# Patient Record
Sex: MALE | Race: WHITE | NOT HISPANIC OR LATINO | Employment: OTHER | URBAN - METROPOLITAN AREA
[De-identification: names, ages, dates, MRNs, and addresses within clinical notes are randomized per-mention and may not be internally consistent; named-entity substitution may affect disease eponyms.]

---

## 2017-05-01 ENCOUNTER — HOSPITAL ENCOUNTER (OUTPATIENT)
Dept: GENERAL RADIOLOGY | Facility: HOSPITAL | Age: 67
Discharge: HOME | End: 2017-05-01
Admitting: ORTHOPAEDIC SURGERY

## 2017-05-01 ENCOUNTER — APPOINTMENT (OUTPATIENT)
Dept: PREADMISSION TESTING | Facility: HOSPITAL | Age: 67
End: 2017-05-01

## 2017-05-01 VITALS
OXYGEN SATURATION: 96 % | DIASTOLIC BLOOD PRESSURE: 88 MMHG | SYSTOLIC BLOOD PRESSURE: 146 MMHG | HEART RATE: 99 BPM | WEIGHT: 211.2 LBS | RESPIRATION RATE: 16 BRPM | BODY MASS INDEX: 28.61 KG/M2 | TEMPERATURE: 98.5 F | HEIGHT: 72 IN

## 2017-05-01 LAB
ALBUMIN SERPL-MCNC: 4 G/DL (ref 3.5–5.2)
ALBUMIN/GLOB SERPL: 1.4 G/DL
ALP SERPL-CCNC: 87 U/L (ref 39–117)
ALT SERPL W P-5'-P-CCNC: 26 U/L (ref 1–41)
ANION GAP SERPL CALCULATED.3IONS-SCNC: 14.8 MMOL/L
AST SERPL-CCNC: 21 U/L (ref 1–40)
BASOPHILS # BLD AUTO: 0.06 10*3/MM3 (ref 0–0.2)
BASOPHILS NFR BLD AUTO: 1.3 % (ref 0–1.5)
BILIRUB SERPL-MCNC: 0.7 MG/DL (ref 0.1–1.2)
BILIRUB UR QL STRIP: NEGATIVE
BUN BLD-MCNC: 22 MG/DL (ref 8–23)
BUN/CREAT SERPL: 17.9 (ref 7–25)
CALCIUM SPEC-SCNC: 10.5 MG/DL (ref 8.6–10.5)
CHLORIDE SERPL-SCNC: 98 MMOL/L (ref 98–107)
CLARITY UR: CLEAR
CO2 SERPL-SCNC: 21.2 MMOL/L (ref 22–29)
COLOR UR: ABNORMAL
CREAT BLD-MCNC: 1.23 MG/DL (ref 0.76–1.27)
DEPRECATED RDW RBC AUTO: 49 FL (ref 37–54)
EOSINOPHIL # BLD AUTO: 0.22 10*3/MM3 (ref 0–0.7)
EOSINOPHIL NFR BLD AUTO: 4.6 % (ref 0.3–6.2)
ERYTHROCYTE [DISTWIDTH] IN BLOOD BY AUTOMATED COUNT: 13.8 % (ref 11.5–14.5)
GFR SERPL CREATININE-BSD FRML MDRD: 59 ML/MIN/1.73
GLOBULIN UR ELPH-MCNC: 2.9 GM/DL
GLUCOSE BLD-MCNC: 330 MG/DL (ref 65–99)
GLUCOSE UR STRIP-MCNC: ABNORMAL MG/DL
HCT VFR BLD AUTO: 43.5 % (ref 40.4–52.2)
HGB BLD-MCNC: 14.8 G/DL (ref 13.7–17.6)
HGB UR QL STRIP.AUTO: NEGATIVE
IMM GRANULOCYTES # BLD: 0 10*3/MM3 (ref 0–0.03)
IMM GRANULOCYTES NFR BLD: 0 % (ref 0–0.5)
KETONES UR QL STRIP: ABNORMAL
LEUKOCYTE ESTERASE UR QL STRIP.AUTO: NEGATIVE
LYMPHOCYTES # BLD AUTO: 0.89 10*3/MM3 (ref 0.9–4.8)
LYMPHOCYTES NFR BLD AUTO: 18.5 % (ref 19.6–45.3)
MCH RBC QN AUTO: 33 PG (ref 27–32.7)
MCHC RBC AUTO-ENTMCNC: 34 G/DL (ref 32.6–36.4)
MCV RBC AUTO: 97.1 FL (ref 79.8–96.2)
MONOCYTES # BLD AUTO: 0.55 10*3/MM3 (ref 0.2–1.2)
MONOCYTES NFR BLD AUTO: 11.5 % (ref 5–12)
NEUTROPHILS # BLD AUTO: 3.08 10*3/MM3 (ref 1.9–8.1)
NEUTROPHILS NFR BLD AUTO: 64.1 % (ref 42.7–76)
NITRITE UR QL STRIP: NEGATIVE
PH UR STRIP.AUTO: <=5 [PH] (ref 5–8)
PLATELET # BLD AUTO: 160 10*3/MM3 (ref 140–500)
PMV BLD AUTO: 10.2 FL (ref 6–12)
POTASSIUM BLD-SCNC: 4.4 MMOL/L (ref 3.5–5.2)
PROT SERPL-MCNC: 6.9 G/DL (ref 6–8.5)
PROT UR QL STRIP: NEGATIVE
RBC # BLD AUTO: 4.48 10*6/MM3 (ref 4.6–6)
SODIUM BLD-SCNC: 134 MMOL/L (ref 136–145)
SP GR UR STRIP: >=1.03 (ref 1–1.03)
UROBILINOGEN UR QL STRIP: ABNORMAL
WBC NRBC COR # BLD: 4.8 10*3/MM3 (ref 4.5–10.7)

## 2017-05-01 PROCEDURE — 85025 COMPLETE CBC W/AUTO DIFF WBC: CPT | Performed by: ORTHOPAEDIC SURGERY

## 2017-05-01 PROCEDURE — 36415 COLL VENOUS BLD VENIPUNCTURE: CPT

## 2017-05-01 PROCEDURE — 73560 X-RAY EXAM OF KNEE 1 OR 2: CPT

## 2017-05-01 PROCEDURE — 80053 COMPREHEN METABOLIC PANEL: CPT | Performed by: ORTHOPAEDIC SURGERY

## 2017-05-01 PROCEDURE — 81003 URINALYSIS AUTO W/O SCOPE: CPT | Performed by: ORTHOPAEDIC SURGERY

## 2017-05-01 RX ORDER — CHLORHEXIDINE GLUCONATE 500 MG/1
1 CLOTH TOPICAL TAKE AS DIRECTED
COMMUNITY
End: 2017-06-24 | Stop reason: HOSPADM

## 2017-05-01 RX ORDER — ZOLPIDEM TARTRATE 10 MG/1
10 TABLET ORAL NIGHTLY PRN
COMMUNITY

## 2017-05-16 ENCOUNTER — HOSPITAL ENCOUNTER (OUTPATIENT)
Facility: HOSPITAL | Age: 67
Setting detail: SURGERY ADMIT
Discharge: HOME | End: 2017-05-16
Attending: ORTHOPAEDIC SURGERY | Admitting: ORTHOPAEDIC SURGERY

## 2017-05-16 VITALS
WEIGHT: 207.1 LBS | DIASTOLIC BLOOD PRESSURE: 92 MMHG | HEART RATE: 98 BPM | TEMPERATURE: 98 F | BODY MASS INDEX: 28.05 KG/M2 | OXYGEN SATURATION: 95 % | SYSTOLIC BLOOD PRESSURE: 142 MMHG | RESPIRATION RATE: 20 BRPM | HEIGHT: 72 IN

## 2017-05-16 LAB — GLUCOSE BLDC GLUCOMTR-MCNC: 251 MG/DL (ref 70–130)

## 2017-05-16 PROCEDURE — 82962 GLUCOSE BLOOD TEST: CPT

## 2017-05-16 RX ORDER — CEFAZOLIN SODIUM 2 G/100ML
2 INJECTION, SOLUTION INTRAVENOUS ONCE
Status: DISCONTINUED | OUTPATIENT
Start: 2017-05-16 | End: 2017-05-16 | Stop reason: HOSPADM

## 2017-06-20 ENCOUNTER — APPOINTMENT (OUTPATIENT)
Dept: PREADMISSION TESTING | Facility: HOSPITAL | Age: 67
End: 2017-06-20

## 2017-06-20 VITALS
TEMPERATURE: 98.6 F | SYSTOLIC BLOOD PRESSURE: 162 MMHG | HEART RATE: 94 BPM | BODY MASS INDEX: 28.18 KG/M2 | DIASTOLIC BLOOD PRESSURE: 82 MMHG | HEIGHT: 72 IN | WEIGHT: 208.06 LBS | RESPIRATION RATE: 16 BRPM | OXYGEN SATURATION: 96 %

## 2017-06-20 LAB
ABO GROUP BLD: NORMAL
ALBUMIN SERPL-MCNC: 4.2 G/DL (ref 3.5–5.2)
ALBUMIN/GLOB SERPL: 1.4 G/DL
ALP SERPL-CCNC: 73 U/L (ref 39–117)
ALT SERPL W P-5'-P-CCNC: 38 U/L (ref 1–41)
ANION GAP SERPL CALCULATED.3IONS-SCNC: 14.1 MMOL/L
AST SERPL-CCNC: 25 U/L (ref 1–40)
BASOPHILS # BLD AUTO: 0.04 10*3/MM3 (ref 0–0.2)
BASOPHILS NFR BLD AUTO: 0.8 % (ref 0–1.5)
BILIRUB SERPL-MCNC: 0.5 MG/DL (ref 0.1–1.2)
BILIRUB UR QL STRIP: NEGATIVE
BLD GP AB SCN SERPL QL: NEGATIVE
BUN BLD-MCNC: 15 MG/DL (ref 8–23)
BUN/CREAT SERPL: 13.5 (ref 7–25)
CALCIUM SPEC-SCNC: 10 MG/DL (ref 8.6–10.5)
CHLORIDE SERPL-SCNC: 94 MMOL/L (ref 98–107)
CLARITY UR: CLEAR
CO2 SERPL-SCNC: 25.9 MMOL/L (ref 22–29)
COLOR UR: ABNORMAL
CREAT BLD-MCNC: 1.11 MG/DL (ref 0.76–1.27)
DEPRECATED RDW RBC AUTO: 45.2 FL (ref 37–54)
EOSINOPHIL # BLD AUTO: 0.22 10*3/MM3 (ref 0–0.7)
EOSINOPHIL NFR BLD AUTO: 4.6 % (ref 0.3–6.2)
ERYTHROCYTE [DISTWIDTH] IN BLOOD BY AUTOMATED COUNT: 12.3 % (ref 11.5–14.5)
GFR SERPL CREATININE-BSD FRML MDRD: 66 ML/MIN/1.73
GLOBULIN UR ELPH-MCNC: 2.9 GM/DL
GLUCOSE BLD-MCNC: 257 MG/DL (ref 65–99)
GLUCOSE UR STRIP-MCNC: ABNORMAL MG/DL
HCT VFR BLD AUTO: 43.5 % (ref 40.4–52.2)
HGB BLD-MCNC: 14.8 G/DL (ref 13.7–17.6)
HGB UR QL STRIP.AUTO: NEGATIVE
IMM GRANULOCYTES # BLD: 0 10*3/MM3 (ref 0–0.03)
IMM GRANULOCYTES NFR BLD: 0 % (ref 0–0.5)
KETONES UR QL STRIP: ABNORMAL
LEUKOCYTE ESTERASE UR QL STRIP.AUTO: NEGATIVE
LYMPHOCYTES # BLD AUTO: 1.14 10*3/MM3 (ref 0.9–4.8)
LYMPHOCYTES NFR BLD AUTO: 23.9 % (ref 19.6–45.3)
MCH RBC QN AUTO: 33.9 PG (ref 27–32.7)
MCHC RBC AUTO-ENTMCNC: 34 G/DL (ref 32.6–36.4)
MCV RBC AUTO: 99.8 FL (ref 79.8–96.2)
MONOCYTES # BLD AUTO: 0.44 10*3/MM3 (ref 0.2–1.2)
MONOCYTES NFR BLD AUTO: 9.2 % (ref 5–12)
NEUTROPHILS # BLD AUTO: 2.93 10*3/MM3 (ref 1.9–8.1)
NEUTROPHILS NFR BLD AUTO: 61.5 % (ref 42.7–76)
NITRITE UR QL STRIP: NEGATIVE
PH UR STRIP.AUTO: 5.5 [PH] (ref 5–8)
PLATELET # BLD AUTO: 153 10*3/MM3 (ref 140–500)
PMV BLD AUTO: 10.3 FL (ref 6–12)
POTASSIUM BLD-SCNC: 3.9 MMOL/L (ref 3.5–5.2)
PROT SERPL-MCNC: 7.1 G/DL (ref 6–8.5)
PROT UR QL STRIP: NEGATIVE
RBC # BLD AUTO: 4.36 10*6/MM3 (ref 4.6–6)
RH BLD: POSITIVE
SODIUM BLD-SCNC: 134 MMOL/L (ref 136–145)
SP GR UR STRIP: >=1.03 (ref 1–1.03)
UROBILINOGEN UR QL STRIP: ABNORMAL
WBC NRBC COR # BLD: 4.77 10*3/MM3 (ref 4.5–10.7)

## 2017-06-20 PROCEDURE — 93010 ELECTROCARDIOGRAM REPORT: CPT | Performed by: INTERNAL MEDICINE

## 2017-06-20 RX ORDER — ACETAMINOPHEN,DIPHENHYDRAMINE HCL 500; 25 MG/1; MG/1
1 TABLET, FILM COATED ORAL NIGHTLY PRN
COMMUNITY

## 2017-06-20 NOTE — DISCHARGE INSTRUCTIONS
Take the following medications the morning of surgery with a small sip of water:  LISINOPRIL      General Instructions:  • Do not eat solid food after midnight the night before surgery.  • You may drink clear liquids day of surgery but must stop at least one hour before your hospital arrival time.  • It is beneficial for you to have a clear drink that contains carbohydrates the day of surgery.  We suggest a 20 ounce bottle of Gatorade or Powerade for non-diabetic patients or a 20 ounce bottle of G2 or Powerade Zero for diabetic patients. (Pediatric patients, are not advised to drink a 20 ounce carbohydrate drink)    Clear liquids are liquids you can see through.  Nothing red in color.     Plain water                               Sports drinks  Sodas                                   Gelatin (Jell-O)  Fruit juices without pulp such as white grape juice and apple juice  Popsicles that contain no fruit or yogurt  Tea or coffee (no cream or milk added)  Gatorade / Powerade  G2 / Powerade Zero  .   • Patients who avoid smoking, chewing tobacco and alcohol for 4 weeks prior to surgery have a reduced risk of post-operative complications.  Quit smoking as many days before surgery as you can.  • Do not smoke, use chewing tobacco or drink alcohol the day of surgery.   • Bring any papers given to you in the doctor’s office.  • Wear clean comfortable clothes and socks.  • Do not wear contact lenses or make-up.  Bring a case for your glasses.   • Bring crutches or walker if applicable.  • Leave all other valuables and jewelry at home.  • The Pre-Admission Testing nurse will instruct you to bring medications if unable to obtain an accurate list in Pre-Admission Testing.        If you were given a blood bank ID arm band remember to bring it with you the day of surgery.    Preventing a Surgical Site Infection:  • For 2 to 3 days before surgery, avoid shaving with a razor because the razor can irritate skin and make it easier to  develop an infection.  • The night prior to surgery sleep in a clean bed with clean clothing.  Do not allow pets to sleep with you.  • Shower on the morning of surgery using a fresh bar of anti-bacterial soap (such as Dial) and clean washcloth.  Dry with a clean towel and dress in clean clothing.  • Ask your surgeon if you will be receiving antibiotics prior to surgery.  • Make sure you, your family, and all healthcare providers clean their hands with soap and water or an alcohol based hand  before caring for you or your wound.    Day of surgery:  Upon arrival, a Pre-op nurse and Anesthesiologist will review your health history, obtain vital signs, and answer questions you may have.  The only belongings needed at this time will be your home medications and if applicable your C-PAP/BI-PAP machine.  If you are staying overnight your family can leave the rest of your belongings in the car and bring them to your room later.  A Pre-op nurse will start an IV and you may receive medication in preparation for surgery, including something to help you relax.  Your family will be able to see you in the Pre-op area.  While you are in surgery your family should notify the waiting room  if they leave the waiting room area and provide a contact phone number.    Please be aware that surgery does come with discomfort.  We want to make every effort to control your discomfort so please discuss any uncontrolled symptoms with your nurse.   Your doctor will most likely have prescribed pain medications.      If you are staying overnight following surgery, you will be transported to your hospital room following the recovery period.  [facility] has all private rooms.    If you have any questions please call Pre-Admission Testing at [phone].      2% CHLORAHEXIDINE GLUCONATE* CLOTH   X2  Preparing or “prepping” skin before surgery can reduce the risk of infection at the surgical site. To make the process  easier, [facility] has chosen disposable cloths moistened with a rinse-free, 2% Chlorhexidine Gluconate (CHG) antiseptic solution. The steps below outline the prepping process and should be carefully followed.        Use the prep cloth on the area that is circled in the diagram             Directions Night before Surgery  1) Shower using a fresh bar of anti-bacterial soap (such as Dial) and clean washcloth.  Use a clean towel to completely dry your skin.  2) Do not use any lotions, oils or creams on your skin.  3) Open the package and remove 1 cloth, wipe your skin for 30 seconds in a circular motion.  Allow to dry for 3 minutes.  4) Repeat #3 with second cloth.  5) Do not touch your eyes, ears, or mouth with the prep cloth.  6) Allow the wet prep solution to air dry.  7) Discard the prep cloth and wash your hands with soap and water.   8) Dress in clean bed clothes and sleep on fresh clean bed sheets.   9) You may experience some temporary itching after the prep.    Directions Day of Surgery  1) Repeat steps 1,2,3,4,5,6,7, and 9.   2) Dress in clean clothes before coming to the hospital.    BACTROBAN NASAL OINTMENT  There are many germs normally in your nose. Bactroban is an ointment that will help reduce these germs. Please follow these instructions for Bactroban use:    _1___The day before surgery in the morning  [id number]_______    _2___The day before surgery in the evening              [id number]_______    _3___The day of surgery in the morning    [id number]_______    **Squirt ½ package of Bactroban Ointment onto a cotton applicator and apply to inside of 1st nostril.  Squirt the remaining Bactroban and apply to the inside of the other nostril.        Deductibles and co-payments are collected on the day of service. Please be prepared to pay the required co-pay, deductible or deposit on the day of service as defined by your plan.

## 2017-06-21 ENCOUNTER — ANESTHESIA EVENT (OUTPATIENT)
Dept: PERIOP | Facility: HOSPITAL | Age: 67
End: 2017-06-21

## 2017-06-21 ENCOUNTER — ANESTHESIA (OUTPATIENT)
Dept: PERIOP | Facility: HOSPITAL | Age: 67
End: 2017-06-21

## 2017-06-21 ENCOUNTER — HOSPITAL ENCOUNTER (INPATIENT)
Facility: HOSPITAL | Age: 67
LOS: 3 days | Discharge: HOME HEALTH | End: 2017-06-24
Attending: ORTHOPAEDIC SURGERY | Admitting: ORTHOPAEDIC SURGERY

## 2017-06-21 DIAGNOSIS — R26.89 IMPAIRED GAIT AND MOBILITY: Primary | ICD-10-CM

## 2017-06-21 LAB
GLUCOSE BLDC GLUCOMTR-MCNC: 144 MG/DL (ref 70–130)
GLUCOSE BLDC GLUCOMTR-MCNC: 161 MG/DL (ref 70–130)
GLUCOSE BLDC GLUCOMTR-MCNC: 250 MG/DL (ref 70–130)

## 2017-06-21 PROCEDURE — 25010000002 PHENYLEPHRINE PER 1 ML: Performed by: ANESTHESIOLOGY

## 2017-06-21 PROCEDURE — 25010000002 FENTANYL CITRATE (PF) 100 MCG/2ML SOLUTION: Performed by: ANESTHESIOLOGY

## 2017-06-21 PROCEDURE — 82962 GLUCOSE BLOOD TEST: CPT

## 2017-06-21 PROCEDURE — 25010000003 CEFAZOLIN IN DEXTROSE 2-4 GM/100ML-% SOLUTION: Performed by: ORTHOPAEDIC SURGERY

## 2017-06-21 PROCEDURE — 25010000002 HYDROMORPHONE PER 4 MG: Performed by: ANESTHESIOLOGY

## 2017-06-21 PROCEDURE — C1776 JOINT DEVICE (IMPLANTABLE): HCPCS | Performed by: ORTHOPAEDIC SURGERY

## 2017-06-21 PROCEDURE — 25010000002 ONDANSETRON PER 1 MG: Performed by: ANESTHESIOLOGY

## 2017-06-21 PROCEDURE — C1713 ANCHOR/SCREW BN/BN,TIS/BN: HCPCS | Performed by: ORTHOPAEDIC SURGERY

## 2017-06-21 PROCEDURE — 25010000002 DEXAMETHASONE PER 1 MG: Performed by: ANESTHESIOLOGY

## 2017-06-21 PROCEDURE — 25010000002 NEOSTIGMINE PER 0.5 MG: Performed by: ANESTHESIOLOGY

## 2017-06-21 PROCEDURE — 0SRC0J9 REPLACEMENT OF RIGHT KNEE JOINT WITH SYNTHETIC SUBSTITUTE, CEMENTED, OPEN APPROACH: ICD-10-PCS | Performed by: ORTHOPAEDIC SURGERY

## 2017-06-21 PROCEDURE — 97161 PT EVAL LOW COMPLEX 20 MIN: CPT

## 2017-06-21 PROCEDURE — 25010000002 KETOROLAC TROMETHAMINE PER 15 MG: Performed by: ORTHOPAEDIC SURGERY

## 2017-06-21 PROCEDURE — 25010000002 MIDAZOLAM PER 1 MG: Performed by: ANESTHESIOLOGY

## 2017-06-21 PROCEDURE — 94799 UNLISTED PULMONARY SVC/PX: CPT

## 2017-06-21 PROCEDURE — 25010000002 PROPOFOL 10 MG/ML EMULSION: Performed by: ANESTHESIOLOGY

## 2017-06-21 DEVICE — TOTAL KN CONFORMIS PT/SPECIF: Type: IMPLANTABLE DEVICE | Site: KNEE | Status: FUNCTIONAL

## 2017-06-21 DEVICE — IMPLANTABLE DEVICE: Type: IMPLANTABLE DEVICE | Site: KNEE | Status: FUNCTIONAL

## 2017-06-21 RX ORDER — HYDROMORPHONE HYDROCHLORIDE 1 MG/ML
0.5 INJECTION, SOLUTION INTRAMUSCULAR; INTRAVENOUS; SUBCUTANEOUS
Status: DISCONTINUED | OUTPATIENT
Start: 2017-06-21 | End: 2017-06-21 | Stop reason: HOSPADM

## 2017-06-21 RX ORDER — OXYCODONE AND ACETAMINOPHEN 10; 325 MG/1; MG/1
1 TABLET ORAL EVERY 6 HOURS PRN
Status: DISCONTINUED | OUTPATIENT
Start: 2017-06-21 | End: 2017-06-21

## 2017-06-21 RX ORDER — EPHEDRINE SULFATE 50 MG/ML
INJECTION, SOLUTION INTRAVENOUS AS NEEDED
Status: DISCONTINUED | OUTPATIENT
Start: 2017-06-21 | End: 2017-06-21 | Stop reason: SURG

## 2017-06-21 RX ORDER — LISINOPRIL 20 MG/1
20 TABLET ORAL DAILY
Status: DISCONTINUED | OUTPATIENT
Start: 2017-06-22 | End: 2017-06-24 | Stop reason: HOSPADM

## 2017-06-21 RX ORDER — ZOLPIDEM TARTRATE 5 MG/1
10 TABLET ORAL NIGHTLY PRN
Status: DISCONTINUED | OUTPATIENT
Start: 2017-06-21 | End: 2017-06-24 | Stop reason: HOSPADM

## 2017-06-21 RX ORDER — HYDRALAZINE HYDROCHLORIDE 20 MG/ML
5 INJECTION INTRAMUSCULAR; INTRAVENOUS
Status: DISCONTINUED | OUTPATIENT
Start: 2017-06-21 | End: 2017-06-21 | Stop reason: HOSPADM

## 2017-06-21 RX ORDER — LIDOCAINE HYDROCHLORIDE 20 MG/ML
INJECTION, SOLUTION INFILTRATION; PERINEURAL AS NEEDED
Status: DISCONTINUED | OUTPATIENT
Start: 2017-06-21 | End: 2017-06-21 | Stop reason: SURG

## 2017-06-21 RX ORDER — DIAZEPAM 5 MG/1
5 TABLET ORAL EVERY 6 HOURS PRN
Status: DISCONTINUED | OUTPATIENT
Start: 2017-06-21 | End: 2017-06-24 | Stop reason: HOSPADM

## 2017-06-21 RX ORDER — ONDANSETRON 2 MG/ML
4 INJECTION INTRAMUSCULAR; INTRAVENOUS ONCE AS NEEDED
Status: DISCONTINUED | OUTPATIENT
Start: 2017-06-21 | End: 2017-06-21 | Stop reason: HOSPADM

## 2017-06-21 RX ORDER — HYDROMORPHONE HYDROCHLORIDE 1 MG/ML
0.5 INJECTION, SOLUTION INTRAMUSCULAR; INTRAVENOUS; SUBCUTANEOUS
Status: DISCONTINUED | OUTPATIENT
Start: 2017-06-21 | End: 2017-06-24 | Stop reason: HOSPADM

## 2017-06-21 RX ORDER — SODIUM CHLORIDE, SODIUM LACTATE, POTASSIUM CHLORIDE, CALCIUM CHLORIDE 600; 310; 30; 20 MG/100ML; MG/100ML; MG/100ML; MG/100ML
9 INJECTION, SOLUTION INTRAVENOUS CONTINUOUS
Status: DISCONTINUED | OUTPATIENT
Start: 2017-06-21 | End: 2017-06-24 | Stop reason: HOSPADM

## 2017-06-21 RX ORDER — DEXAMETHASONE SODIUM PHOSPHATE 10 MG/ML
INJECTION INTRAMUSCULAR; INTRAVENOUS AS NEEDED
Status: DISCONTINUED | OUTPATIENT
Start: 2017-06-21 | End: 2017-06-21 | Stop reason: SURG

## 2017-06-21 RX ORDER — KETOROLAC TROMETHAMINE 15 MG/ML
15 INJECTION, SOLUTION INTRAMUSCULAR; INTRAVENOUS EVERY 6 HOURS
Status: COMPLETED | OUTPATIENT
Start: 2017-06-21 | End: 2017-06-22

## 2017-06-21 RX ORDER — HYDROCODONE BITARTRATE AND ACETAMINOPHEN 7.5; 325 MG/1; MG/1
1 TABLET ORAL ONCE AS NEEDED
Status: DISCONTINUED | OUTPATIENT
Start: 2017-06-21 | End: 2017-06-21 | Stop reason: HOSPADM

## 2017-06-21 RX ORDER — SODIUM CHLORIDE 0.9 % (FLUSH) 0.9 %
1-10 SYRINGE (ML) INJECTION AS NEEDED
Status: DISCONTINUED | OUTPATIENT
Start: 2017-06-21 | End: 2017-06-24 | Stop reason: HOSPADM

## 2017-06-21 RX ORDER — GABAPENTIN 600 MG/1
600 TABLET ORAL 3 TIMES DAILY PRN
COMMUNITY

## 2017-06-21 RX ORDER — DIPHENHYDRAMINE HCL 25 MG
25 CAPSULE ORAL NIGHTLY PRN
Status: DISCONTINUED | OUTPATIENT
Start: 2017-06-21 | End: 2017-06-24 | Stop reason: HOSPADM

## 2017-06-21 RX ORDER — OXYCODONE AND ACETAMINOPHEN 10; 325 MG/1; MG/1
1 TABLET ORAL EVERY 4 HOURS PRN
Status: DISCONTINUED | OUTPATIENT
Start: 2017-06-21 | End: 2017-06-24 | Stop reason: HOSPADM

## 2017-06-21 RX ORDER — PROMETHAZINE HYDROCHLORIDE 25 MG/ML
12.5 INJECTION, SOLUTION INTRAMUSCULAR; INTRAVENOUS ONCE AS NEEDED
Status: DISCONTINUED | OUTPATIENT
Start: 2017-06-21 | End: 2017-06-21 | Stop reason: HOSPADM

## 2017-06-21 RX ORDER — HYDROMORPHONE HCL 110MG/55ML
PATIENT CONTROLLED ANALGESIA SYRINGE INTRAVENOUS AS NEEDED
Status: DISCONTINUED | OUTPATIENT
Start: 2017-06-21 | End: 2017-06-21 | Stop reason: SURG

## 2017-06-21 RX ORDER — DIPHENHYDRAMINE HYDROCHLORIDE 50 MG/ML
12.5 INJECTION INTRAMUSCULAR; INTRAVENOUS
Status: DISCONTINUED | OUTPATIENT
Start: 2017-06-21 | End: 2017-06-21 | Stop reason: HOSPADM

## 2017-06-21 RX ORDER — ONDANSETRON 4 MG/1
4 TABLET, ORALLY DISINTEGRATING ORAL EVERY 6 HOURS PRN
Status: DISCONTINUED | OUTPATIENT
Start: 2017-06-21 | End: 2017-06-24 | Stop reason: HOSPADM

## 2017-06-21 RX ORDER — DOCUSATE SODIUM 100 MG/1
100 CAPSULE, LIQUID FILLED ORAL 2 TIMES DAILY PRN
Status: DISCONTINUED | OUTPATIENT
Start: 2017-06-21 | End: 2017-06-24 | Stop reason: HOSPADM

## 2017-06-21 RX ORDER — GLYCOPYRROLATE 0.2 MG/ML
INJECTION INTRAMUSCULAR; INTRAVENOUS AS NEEDED
Status: DISCONTINUED | OUTPATIENT
Start: 2017-06-21 | End: 2017-06-21 | Stop reason: SURG

## 2017-06-21 RX ORDER — PROPOFOL 10 MG/ML
VIAL (ML) INTRAVENOUS AS NEEDED
Status: DISCONTINUED | OUTPATIENT
Start: 2017-06-21 | End: 2017-06-21 | Stop reason: SURG

## 2017-06-21 RX ORDER — OXYCODONE AND ACETAMINOPHEN 10; 325 MG/1; MG/1
2 TABLET ORAL EVERY 4 HOURS PRN
Status: DISCONTINUED | OUTPATIENT
Start: 2017-06-21 | End: 2017-06-24 | Stop reason: HOSPADM

## 2017-06-21 RX ORDER — LIDOCAINE HYDROCHLORIDE 40 MG/ML
SOLUTION TOPICAL AS NEEDED
Status: DISCONTINUED | OUTPATIENT
Start: 2017-06-21 | End: 2017-06-21 | Stop reason: SURG

## 2017-06-21 RX ORDER — ACETAMINOPHEN 325 MG/1
325 TABLET ORAL 4 TIMES DAILY
Status: DISCONTINUED | OUTPATIENT
Start: 2017-06-21 | End: 2017-06-24 | Stop reason: HOSPADM

## 2017-06-21 RX ORDER — TRANEXAMIC ACID 100 MG/ML
INJECTION, SOLUTION INTRAVENOUS AS NEEDED
Status: DISCONTINUED | OUTPATIENT
Start: 2017-06-21 | End: 2017-06-21 | Stop reason: SURG

## 2017-06-21 RX ORDER — ONDANSETRON 2 MG/ML
INJECTION INTRAMUSCULAR; INTRAVENOUS AS NEEDED
Status: DISCONTINUED | OUTPATIENT
Start: 2017-06-21 | End: 2017-06-21 | Stop reason: SURG

## 2017-06-21 RX ORDER — ONDANSETRON 2 MG/ML
4 INJECTION INTRAMUSCULAR; INTRAVENOUS EVERY 6 HOURS PRN
Status: DISCONTINUED | OUTPATIENT
Start: 2017-06-21 | End: 2017-06-24 | Stop reason: HOSPADM

## 2017-06-21 RX ORDER — PROMETHAZINE HYDROCHLORIDE 25 MG/1
25 SUPPOSITORY RECTAL ONCE AS NEEDED
Status: DISCONTINUED | OUTPATIENT
Start: 2017-06-21 | End: 2017-06-21 | Stop reason: HOSPADM

## 2017-06-21 RX ORDER — ACETAMINOPHEN 10 MG/ML
INJECTION, SOLUTION INTRAVENOUS AS NEEDED
Status: DISCONTINUED | OUTPATIENT
Start: 2017-06-21 | End: 2017-06-21 | Stop reason: SURG

## 2017-06-21 RX ORDER — FAMOTIDINE 10 MG/ML
20 INJECTION, SOLUTION INTRAVENOUS ONCE
Status: COMPLETED | OUTPATIENT
Start: 2017-06-21 | End: 2017-06-21

## 2017-06-21 RX ORDER — FENTANYL CITRATE 50 UG/ML
50 INJECTION, SOLUTION INTRAMUSCULAR; INTRAVENOUS
Status: COMPLETED | OUTPATIENT
Start: 2017-06-21 | End: 2017-06-21

## 2017-06-21 RX ORDER — FENTANYL CITRATE 50 UG/ML
50 INJECTION, SOLUTION INTRAMUSCULAR; INTRAVENOUS
Status: DISCONTINUED | OUTPATIENT
Start: 2017-06-21 | End: 2017-06-21 | Stop reason: HOSPADM

## 2017-06-21 RX ORDER — HYDROCODONE BITARTRATE AND ACETAMINOPHEN 7.5; 325 MG/1; MG/1
1 TABLET ORAL EVERY 4 HOURS PRN
Status: DISCONTINUED | OUTPATIENT
Start: 2017-06-21 | End: 2017-06-21

## 2017-06-21 RX ORDER — ONDANSETRON 4 MG/1
4 TABLET, FILM COATED ORAL EVERY 6 HOURS PRN
Status: DISCONTINUED | OUTPATIENT
Start: 2017-06-21 | End: 2017-06-24 | Stop reason: HOSPADM

## 2017-06-21 RX ORDER — PROMETHAZINE HYDROCHLORIDE 25 MG/1
12.5 TABLET ORAL ONCE AS NEEDED
Status: DISCONTINUED | OUTPATIENT
Start: 2017-06-21 | End: 2017-06-21 | Stop reason: HOSPADM

## 2017-06-21 RX ORDER — FLUMAZENIL 0.1 MG/ML
0.2 INJECTION INTRAVENOUS AS NEEDED
Status: DISCONTINUED | OUTPATIENT
Start: 2017-06-21 | End: 2017-06-21 | Stop reason: HOSPADM

## 2017-06-21 RX ORDER — OXYCODONE AND ACETAMINOPHEN 7.5; 325 MG/1; MG/1
1 TABLET ORAL ONCE AS NEEDED
Status: COMPLETED | OUTPATIENT
Start: 2017-06-21 | End: 2017-06-21

## 2017-06-21 RX ORDER — ACETAMINOPHEN,DIPHENHYDRAMINE HCL 500; 25 MG/1; MG/1
1 TABLET, FILM COATED ORAL NIGHTLY PRN
Status: DISCONTINUED | OUTPATIENT
Start: 2017-06-21 | End: 2017-06-21 | Stop reason: CLARIF

## 2017-06-21 RX ORDER — ROCURONIUM BROMIDE 10 MG/ML
INJECTION, SOLUTION INTRAVENOUS AS NEEDED
Status: DISCONTINUED | OUTPATIENT
Start: 2017-06-21 | End: 2017-06-21 | Stop reason: SURG

## 2017-06-21 RX ORDER — PROMETHAZINE HYDROCHLORIDE 25 MG/1
25 TABLET ORAL ONCE AS NEEDED
Status: DISCONTINUED | OUTPATIENT
Start: 2017-06-21 | End: 2017-06-21 | Stop reason: HOSPADM

## 2017-06-21 RX ORDER — SODIUM CHLORIDE 0.9 % (FLUSH) 0.9 %
1-10 SYRINGE (ML) INJECTION AS NEEDED
Status: DISCONTINUED | OUTPATIENT
Start: 2017-06-21 | End: 2017-06-21 | Stop reason: HOSPADM

## 2017-06-21 RX ORDER — NALOXONE HCL 0.4 MG/ML
0.2 VIAL (ML) INJECTION AS NEEDED
Status: DISCONTINUED | OUTPATIENT
Start: 2017-06-21 | End: 2017-06-21 | Stop reason: HOSPADM

## 2017-06-21 RX ORDER — SODIUM CHLORIDE 9 MG/ML
INJECTION, SOLUTION INTRAVENOUS AS NEEDED
Status: DISCONTINUED | OUTPATIENT
Start: 2017-06-21 | End: 2017-06-21 | Stop reason: HOSPADM

## 2017-06-21 RX ORDER — CEFAZOLIN SODIUM 2 G/100ML
2 INJECTION, SOLUTION INTRAVENOUS ONCE
Status: COMPLETED | OUTPATIENT
Start: 2017-06-21 | End: 2017-06-21

## 2017-06-21 RX ORDER — NALOXONE HCL 0.4 MG/ML
0.1 VIAL (ML) INJECTION
Status: DISCONTINUED | OUTPATIENT
Start: 2017-06-21 | End: 2017-06-24 | Stop reason: HOSPADM

## 2017-06-21 RX ORDER — EPHEDRINE SULFATE 50 MG/ML
5 INJECTION, SOLUTION INTRAVENOUS ONCE AS NEEDED
Status: DISCONTINUED | OUTPATIENT
Start: 2017-06-21 | End: 2017-06-21 | Stop reason: HOSPADM

## 2017-06-21 RX ORDER — ACETAMINOPHEN 500 MG
500 TABLET ORAL NIGHTLY PRN
Status: DISCONTINUED | OUTPATIENT
Start: 2017-06-21 | End: 2017-06-24 | Stop reason: HOSPADM

## 2017-06-21 RX ORDER — MIDAZOLAM HYDROCHLORIDE 1 MG/ML
1 INJECTION INTRAMUSCULAR; INTRAVENOUS
Status: DISCONTINUED | OUTPATIENT
Start: 2017-06-21 | End: 2017-06-21 | Stop reason: HOSPADM

## 2017-06-21 RX ORDER — CEFAZOLIN SODIUM 2 G/100ML
2 INJECTION, SOLUTION INTRAVENOUS EVERY 8 HOURS
Status: COMPLETED | OUTPATIENT
Start: 2017-06-21 | End: 2017-06-22

## 2017-06-21 RX ORDER — SODIUM CHLORIDE, SODIUM LACTATE, POTASSIUM CHLORIDE, CALCIUM CHLORIDE 600; 310; 30; 20 MG/100ML; MG/100ML; MG/100ML; MG/100ML
75 INJECTION, SOLUTION INTRAVENOUS CONTINUOUS
Status: DISCONTINUED | OUTPATIENT
Start: 2017-06-21 | End: 2017-06-24 | Stop reason: HOSPADM

## 2017-06-21 RX ORDER — MIDAZOLAM HYDROCHLORIDE 1 MG/ML
2 INJECTION INTRAMUSCULAR; INTRAVENOUS
Status: DISCONTINUED | OUTPATIENT
Start: 2017-06-21 | End: 2017-06-21 | Stop reason: HOSPADM

## 2017-06-21 RX ORDER — DIPHENHYDRAMINE HCL 25 MG
25 CAPSULE ORAL EVERY 6 HOURS PRN
Status: DISCONTINUED | OUTPATIENT
Start: 2017-06-21 | End: 2017-06-24 | Stop reason: HOSPADM

## 2017-06-21 RX ORDER — LABETALOL HYDROCHLORIDE 5 MG/ML
5 INJECTION, SOLUTION INTRAVENOUS
Status: DISCONTINUED | OUTPATIENT
Start: 2017-06-21 | End: 2017-06-21 | Stop reason: HOSPADM

## 2017-06-21 RX ORDER — GABAPENTIN 300 MG/1
600 CAPSULE ORAL NIGHTLY
Status: DISCONTINUED | OUTPATIENT
Start: 2017-06-21 | End: 2017-06-23

## 2017-06-21 RX ORDER — ASPIRIN 325 MG
325 TABLET ORAL EVERY MORNING
Status: ON HOLD | COMMUNITY
End: 2017-06-24

## 2017-06-21 RX ADMIN — DEXAMETHASONE SODIUM PHOSPHATE 8 MG: 10 INJECTION INTRAMUSCULAR; INTRAVENOUS at 11:42

## 2017-06-21 RX ADMIN — NEOSTIGMINE METHYLSULFATE 4 MG: 1 INJECTION INTRAMUSCULAR; INTRAVENOUS; SUBCUTANEOUS at 13:02

## 2017-06-21 RX ADMIN — SODIUM CHLORIDE, POTASSIUM CHLORIDE, SODIUM LACTATE AND CALCIUM CHLORIDE: 600; 310; 30; 20 INJECTION, SOLUTION INTRAVENOUS at 11:23

## 2017-06-21 RX ADMIN — ROCURONIUM BROMIDE 50 MG: 10 INJECTION INTRAVENOUS at 11:29

## 2017-06-21 RX ADMIN — KETOROLAC TROMETHAMINE 15 MG: 30 INJECTION, SOLUTION INTRAMUSCULAR at 22:08

## 2017-06-21 RX ADMIN — FAMOTIDINE 20 MG: 10 INJECTION, SOLUTION INTRAVENOUS at 10:35

## 2017-06-21 RX ADMIN — FENTANYL CITRATE 50 MCG: 50 INJECTION INTRAMUSCULAR; INTRAVENOUS at 14:23

## 2017-06-21 RX ADMIN — CEFAZOLIN SODIUM 2 G: 2 INJECTION, SOLUTION INTRAVENOUS at 20:37

## 2017-06-21 RX ADMIN — ONDANSETRON 4 MG: 2 INJECTION INTRAMUSCULAR; INTRAVENOUS at 13:02

## 2017-06-21 RX ADMIN — FENTANYL CITRATE 50 MCG: 50 INJECTION INTRAMUSCULAR; INTRAVENOUS at 14:04

## 2017-06-21 RX ADMIN — EPHEDRINE SULFATE 10 MG: 50 INJECTION INTRAMUSCULAR; INTRAVENOUS; SUBCUTANEOUS at 11:38

## 2017-06-21 RX ADMIN — MIDAZOLAM 1 MG: 1 INJECTION INTRAMUSCULAR; INTRAVENOUS at 11:16

## 2017-06-21 RX ADMIN — FENTANYL CITRATE 50 MCG: 50 INJECTION INTRAMUSCULAR; INTRAVENOUS at 12:05

## 2017-06-21 RX ADMIN — OXYCODONE HYDROCHLORIDE AND ACETAMINOPHEN 2 TABLET: 10; 325 TABLET ORAL at 23:33

## 2017-06-21 RX ADMIN — HYDROMORPHONE HYDROCHLORIDE 0.5 MG: 1 INJECTION, SOLUTION INTRAMUSCULAR; INTRAVENOUS; SUBCUTANEOUS at 13:59

## 2017-06-21 RX ADMIN — MIDAZOLAM 1 MG: 1 INJECTION INTRAMUSCULAR; INTRAVENOUS at 10:35

## 2017-06-21 RX ADMIN — CEFAZOLIN SODIUM 2 G: 2 INJECTION, SOLUTION INTRAVENOUS at 11:29

## 2017-06-21 RX ADMIN — PHENYLEPHRINE HYDROCHLORIDE 200 MCG: 10 INJECTION INTRAVENOUS at 11:34

## 2017-06-21 RX ADMIN — ACETAMINOPHEN 1000 MG: 10 INJECTION, SOLUTION INTRAVENOUS at 11:39

## 2017-06-21 RX ADMIN — GABAPENTIN 600 MG: 300 CAPSULE ORAL at 23:33

## 2017-06-21 RX ADMIN — PHENYLEPHRINE HYDROCHLORIDE 200 MCG: 10 INJECTION INTRAVENOUS at 13:02

## 2017-06-21 RX ADMIN — PHENYLEPHRINE HYDROCHLORIDE 200 MCG: 10 INJECTION INTRAVENOUS at 11:38

## 2017-06-21 RX ADMIN — HYDROMORPHONE HYDROCHLORIDE 0.5 MG: 1 INJECTION, SOLUTION INTRAMUSCULAR; INTRAVENOUS; SUBCUTANEOUS at 14:36

## 2017-06-21 RX ADMIN — PROPOFOL 200 MG: 10 INJECTION, EMULSION INTRAVENOUS at 11:29

## 2017-06-21 RX ADMIN — FENTANYL CITRATE 25 MCG: 50 INJECTION INTRAMUSCULAR; INTRAVENOUS at 10:35

## 2017-06-21 RX ADMIN — FENTANYL CITRATE 100 MCG: 50 INJECTION INTRAMUSCULAR; INTRAVENOUS at 11:29

## 2017-06-21 RX ADMIN — GLYCOPYRROLATE 0.6 MG: 0.2 INJECTION INTRAMUSCULAR; INTRAVENOUS at 13:02

## 2017-06-21 RX ADMIN — LIDOCAINE HYDROCHLORIDE 1 EACH: 40 SPRAY LARYNGEAL; TRANSTRACHEAL at 11:33

## 2017-06-21 RX ADMIN — MUPIROCIN 1 APPLICATION: 20 OINTMENT TOPICAL at 18:12

## 2017-06-21 RX ADMIN — KETOROLAC TROMETHAMINE 15 MG: 30 INJECTION, SOLUTION INTRAMUSCULAR at 16:13

## 2017-06-21 RX ADMIN — HYDROMORPHONE HYDROCHLORIDE 0.5 MG: 1 INJECTION, SOLUTION INTRAMUSCULAR; INTRAVENOUS; SUBCUTANEOUS at 13:36

## 2017-06-21 RX ADMIN — HYDROMORPHONE HYDROCHLORIDE 0.5 MG: 1 INJECTION, SOLUTION INTRAMUSCULAR; INTRAVENOUS; SUBCUTANEOUS at 13:48

## 2017-06-21 RX ADMIN — TRANEXAMIC ACID 1000 MG: 100 INJECTION, SOLUTION INTRAVENOUS at 12:48

## 2017-06-21 RX ADMIN — DIAZEPAM 5 MG: 5 TABLET ORAL at 18:12

## 2017-06-21 RX ADMIN — LIDOCAINE HYDROCHLORIDE 100 MG: 20 INJECTION, SOLUTION INFILTRATION; PERINEURAL at 11:29

## 2017-06-21 RX ADMIN — OXYCODONE HYDROCHLORIDE AND ACETAMINOPHEN 1 TABLET: 7.5; 325 TABLET ORAL at 14:50

## 2017-06-21 RX ADMIN — FENTANYL CITRATE 50 MCG: 50 INJECTION INTRAMUSCULAR; INTRAVENOUS at 13:55

## 2017-06-21 RX ADMIN — HYDROMORPHONE HYDROCHLORIDE 0.5 MG: 2 INJECTION, SOLUTION INTRAMUSCULAR; INTRAVENOUS; SUBCUTANEOUS at 13:15

## 2017-06-21 RX ADMIN — OXYCODONE HYDROCHLORIDE AND ACETAMINOPHEN 2 TABLET: 10; 325 TABLET ORAL at 19:16

## 2017-06-21 RX ADMIN — PHENYLEPHRINE HYDROCHLORIDE 200 MCG: 10 INJECTION INTRAVENOUS at 13:18

## 2017-06-21 RX ADMIN — FENTANYL CITRATE 50 MCG: 50 INJECTION INTRAMUSCULAR; INTRAVENOUS at 13:39

## 2017-06-21 RX ADMIN — SODIUM CHLORIDE, POTASSIUM CHLORIDE, SODIUM LACTATE AND CALCIUM CHLORIDE 9 ML/HR: 600; 310; 30; 20 INJECTION, SOLUTION INTRAVENOUS at 10:35

## 2017-06-21 NOTE — H&P
History & Physical       Patient: Navdeep Christopher    Date of Admission: 6/21/2017  7:53 AM    YOB: 1950    Medical Record Number: [id number]    Attending Physician: Jeovany Hercules MD        Chief Complaints: OA (osteoarthritis) of knee [M17.9]      History of Present Illness: 67 y.o. male presents with OA (osteoarthritis) of knee [M17.9]. Onset of symptoms was yrs. Symptoms are associated with weight .  Symptoms improve with rest and analgesics.  Patient is now being admitted to the services of Jeovany Hercules MD for further evaluation and treatment.     Allergies: No Known Allergies    Medications:   Home Medications:  Prescriptions Prior to Admission   Medication Sig Dispense Refill Last Dose   • aspirin 325 MG tablet Take 325 mg by mouth Every Morning. ON HOLD FOR SX.   5/1/2017   • Chlorhexidine Gluconate Cloth 2 % pads Apply 1 application topically Take As Directed. Use as directed prior to OR   6/21/2017 at 0600   • diclofenac sodium (VOTAREN XR) 100 MG 24 hr tablet Take 100 mg by mouth Daily.   6/14/2017   • diphenhydrAMINE-acetaminophen (TYLENOL PM)  MG tablet per tablet Take 1 tablet by mouth At Night As Needed for Sleep.   6/14/2017   • gabapentin (NEURONTIN) 600 MG tablet Take 600 mg by mouth 3 (Three) Times a Day As Needed.   6/19/2017   • insulin NPH (HUMULIN N) 100 UNIT/ML injection Inject 70 Units under the skin Every Morning. PT INSTRUCTED TO CALL PCP FOR INSTRUCTIONS REGARDING INSULIN   6/21/2017 at 1800   • metFORMIN (GLUCOPHAGE) 1000 MG tablet Take 1,000 mg by mouth Every Morning.   6/20/2017 at 1000   • oxyCODONE-acetaminophen (PERCOCET)  MG per tablet Take 1 tablet by mouth Every 6 (Six) Hours As Needed for moderate pain (4-6). 80 tablet 0 6/21/2017 at 0100   • zolpidem (AMBIEN) 10 MG tablet Take 10 mg by mouth At Night As Needed for Sleep.   6/19/2017   • lisinopril (PRINIVIL,ZESTRIL) 20 MG tablet Take 20 mg by mouth Every Morning.   6/20/2017 at 1000   • mupirocin  (BACTROBAN) 2 % nasal ointment 1 application into each nostril Take As Directed. Use as directed prior to OR   6/21/2017 at 0600       Current Medications:  Scheduled Meds:  ceFAZolin 2 g Intravenous Once     Continuous Infusions:  lactated ringers 9 mL/hr Last Rate: 9 mL/hr (06/21/17 1035)     PRN Meds:.fentanyl  •  midazolam **OR** midazolam  •  sodium chloride    Past Medical History:   Diagnosis Date   • Anxiety    • Arthritis    • Bone spur     BACK   • Diabetes mellitus     TYPE 2   • Hypertension    • Knee pain     RIGHT   • Low back pain    • Occasional tremors     BILATERAL HANDS   • Sciatica    • Unsteady gait     USES CANE        Past Surgical History:   Procedure Laterality Date   • KNEE ARTHROSCOPY W/ ACL RECONSTRUCTION Right 1987   • ME TOTAL KNEE ARTHROPLASTY Left 12/6/2016    Procedure: LT TOTAL KNEE ARTHROPLASTY;  Surgeon: Jeovany Hercules MD;  Location: [facility] OR;  Service: Orthopedics   • TONSILLECTOMY AND ADENOIDECTOMY          Social History     Occupational History   • Not on file.     Social History Main Topics   • Smoking status: Current Every Day Smoker     Packs/day: 0.50     Years: 10.00     Types: Cigarettes   • Smokeless tobacco: Never Used   • Alcohol use Yes      Comment: daily-vodka-2 drinks   • Drug use: Yes     Special: Marijuana      Comment: RARELY   • Sexual activity: Defer    Social History     Social History Narrative      History reviewed. No pertinent family history.      Review of Systems:   HEENT: Patient denies any headaches, vision changes, change in hearing, or tinnitus, Patient denies any rhinorrhea,epistaxis, sinus pain, mouth or dental problems, sore throat or hoarseness, or dysphagia  Pulmonary: Patient denies any cough, congestion, SOA, or wheezing  Cardiovascular: Patient denies any chest pain, dyspnea, palpitations, weakness, intolerance of exercise, varicosities, swelling of extremities, known murmur  Gastrointestinal:  Patient denies nausea, vomiting,  "diarrhea, constipation, loss  of appetite, change in appetite, dysphagia, gas, heartburn, melena, change in bowel habits, use of laxatives or other drugs to alter the function of the gastrointestinal tract.  Genital/Urinary: Patient denies dysuria, change in color of urine, change in frequency of urination, pain with urgency, incontinence, retention, or nocturia.  Musculoskeletal: Patient denies increased warmth; redness; or swelling of joints; limitation of function; deformity; crepitation: pain in a joint or an extremity, the neck, or the back, especially with movement.  Neurological: Patient denies dizziness, tremor, ataxia, difficulty in speaking, change in speech, paresthesia, loss of sensation, seizures, syncope, changes in memory.  Endocrine system: Patient denies tremors, palpitations, intolerance of heat or cold, polyuria, polydipsia, polyphagia, diaphoresis, exophthalmos, or goiter.  Psychological: Patient denies thoughts/plans or harming self or other; depression,  insomnia, night terrors, samuel, memory loss, disorientation.  Skin: Patient denies any bruising, rashes, discoloration, pruritus, wounds, ulcers, decubiti, changes in the hair or nails  Hematopoietic: Patient denies history of spontaneous or excessive bleeding, epistaxis, hematuria, melena, fatigue, enlarged or tender lymph nodes, pallor, history of anemia.    Physical Exam: 67 y.o. male  General Appearance:    Alert, cooperative, in no acute distress                    Vitals:    06/21/17 0900 06/21/17 1039 06/21/17 1118   BP: 145/82     BP Location: Left arm     Patient Position: Lying     Pulse: 100     Resp: 18     Temp: 98.1 °F (36.7 °C)     TempSrc: Oral     SpO2: 96% 99%  Comment: POST SEDATION 98%   Weight: 207 lb 7 oz (94.1 kg)     Height: 72\" (182.9 cm)          Head:    Normocephalic, without obvious abnormality, atraumatic   Eyes:            Lids and lashes normal, conjunctivae and sclerae normal, no   icterus, no pallor, corneas " clear, PERRLA   Ears:    Ears appear intact with no abnormalities noted   Throat:   No oral lesions, no thrush, oral mucosa moist   Neck:   No adenopathy, supple, trachea midline, no thyromegaly, no   carotid bruit, no JVD   Back:     No kyphosis present, no scoliosis present, no skin lesions,      erythema or scars, no tenderness to percussion or                   palpation,   range of motion normal   Lungs:     Clear to auscultation,respirations regular, even and                  unlabored    Heart:    Regular rhythm and normal rate, normal S1 and S2, no            murmur, no gallop, no rub, no click   Chest Wall:    No abnormalities observed   Abdomen:     Normal bowel sounds, no masses, no organomegaly, soft        non-tender, non-distended, no guarding, no rebound                tenderness   Rectal:     Deferred   Extremities:   Tenderness over  Med joint   varus  .    Pulses:   Pulses palpable and equal bilaterally   Skin:   No bleeding, bruising or rash   Lymph nodes:   No palpable adenopathy   Neurologic:   Cranial nerves 2 - 12 grossly intact, sensation intact, DTR       present and equal bilaterally           Diagnostic Tests:  Admission on 06/21/2017   Component Date Value Ref Range Status   • Glucose 06/21/2017 161* 70 - 130 mg/dL Final   Appointment on 06/20/2017   Component Date Value Ref Range Status   • Glucose 06/20/2017 257* 65 - 99 mg/dL Final   • BUN 06/20/2017 15  8 - 23 mg/dL Final   • Creatinine 06/20/2017 1.11  0.76 - 1.27 mg/dL Final   • Sodium 06/20/2017 134* 136 - 145 mmol/L Final   • Potassium 06/20/2017 3.9  3.5 - 5.2 mmol/L Final   • Chloride 06/20/2017 94* 98 - 107 mmol/L Final   • CO2 06/20/2017 25.9  22.0 - 29.0 mmol/L Final   • Calcium 06/20/2017 10.0  8.6 - 10.5 mg/dL Final   • Total Protein 06/20/2017 7.1  6.0 - 8.5 g/dL Final   • Albumin 06/20/2017 4.20  3.50 - 5.20 g/dL Final   • ALT (SGPT) 06/20/2017 38  1 - 41 U/L Final   • AST (SGOT) 06/20/2017 25  1 - 40 U/L Final   •  Alkaline Phosphatase 06/20/2017 73  39 - 117 U/L Final   • Total Bilirubin 06/20/2017 0.5  0.1 - 1.2 mg/dL Final   • eGFR Non  Amer 06/20/2017 66  >60 mL/min/1.73 Final   • Globulin 06/20/2017 2.9  gm/dL Final   • A/G Ratio 06/20/2017 1.4  g/dL Final   • BUN/Creatinine Ratio 06/20/2017 13.5  7.0 - 25.0 Final   • Anion Gap 06/20/2017 14.1  mmol/L Final   • ABO Type 06/20/2017 O   Final   • RH type 06/20/2017 Positive   Final   • Antibody Screen 06/20/2017 Negative   Final   • Color, UA 06/20/2017 Dark Yellow* Yellow, Straw Final   • Appearance, UA 06/20/2017 Clear  Clear Final   • pH, UA 06/20/2017 5.5  5.0 - 8.0 Final   • Specific Gravity, UA 06/20/2017 >=1.030  1.005 - 1.030 Final   • Glucose, UA 06/20/2017 >=1000 mg/dL (3+)* Negative Final   • Ketones, UA 06/20/2017 Trace* Negative Final   • Bilirubin, UA 06/20/2017 Negative  Negative Final   • Blood, UA 06/20/2017 Negative  Negative Final   • Protein, UA 06/20/2017 Negative  Negative Final   • Leuk Esterase, UA 06/20/2017 Negative  Negative Final   • Nitrite, UA 06/20/2017 Negative  Negative Final   • Urobilinogen, UA 06/20/2017 1.0 E.U./dL  0.2 - 1.0 E.U./dL Final   • WBC 06/20/2017 4.77  4.50 - 10.70 10*3/mm3 Final   • RBC 06/20/2017 4.36* 4.60 - 6.00 10*6/mm3 Final   • Hemoglobin 06/20/2017 14.8  13.7 - 17.6 g/dL Final   • Hematocrit 06/20/2017 43.5  40.4 - 52.2 % Final   • MCV 06/20/2017 99.8* 79.8 - 96.2 fL Final   • MCH 06/20/2017 33.9* 27.0 - 32.7 pg Final   • MCHC 06/20/2017 34.0  32.6 - 36.4 g/dL Final   • RDW 06/20/2017 12.3  11.5 - 14.5 % Final   • RDW-SD 06/20/2017 45.2  37.0 - 54.0 fl Final   • MPV 06/20/2017 10.3  6.0 - 12.0 fL Final   • Platelets 06/20/2017 153  140 - 500 10*3/mm3 Final   • Neutrophil % 06/20/2017 61.5  42.7 - 76.0 % Final   • Lymphocyte % 06/20/2017 23.9  19.6 - 45.3 % Final   • Monocyte % 06/20/2017 9.2  5.0 - 12.0 % Final   • Eosinophil % 06/20/2017 4.6  0.3 - 6.2 % Final   • Basophil % 06/20/2017 0.8  0.0 - 1.5 % Final    • Immature Grans % 06/20/2017 0.0  0.0 - 0.5 % Final   • Neutrophils, Absolute 06/20/2017 2.93  1.90 - 8.10 10*3/mm3 Final   • Lymphocytes, Absolute 06/20/2017 1.14  0.90 - 4.80 10*3/mm3 Final   • Monocytes, Absolute 06/20/2017 0.44  0.20 - 1.20 10*3/mm3 Final   • Eosinophils, Absolute 06/20/2017 0.22  0.00 - 0.70 10*3/mm3 Final   • Basophils, Absolute 06/20/2017 0.04  0.00 - 0.20 10*3/mm3 Final   • Immature Grans, Absolute 06/20/2017 0.00  0.00 - 0.03 10*3/mm3 Final     No results found.        Assessment:  Patient Active Problem List   Diagnosis   • OA (osteoarthritis) of knee           Plan:  The patient voiced understanding of the risks, benefits, and alternative forms of treatment that were discussed and the patient consents to proceed with tka right.           Date:6/21/2017    Jeovany Hercules MD

## 2017-06-21 NOTE — ANESTHESIA POSTPROCEDURE EVALUATION
Patient: Navdeep Christopher    Procedure Summary     Date Anesthesia Start Anesthesia Stop Room / Location    06/21/17 1123 1325 [facility] OR [facility] / [facility]       Procedure Diagnosis Surgeon Provider    RT TOTAL KNEE ARTHROPLASTY (Right Knee) No diagnosis on file. MD Daniel Mancera MD          Anesthesia Type: general  Last vitals  /86 (06/21/17 1445)    Temp 37 °C (98.6 °F) (06/21/17 1445)    Pulse 102 (06/21/17 1445)   Resp 14 (06/21/17 1445)    SpO2 95 % (06/21/17 1445)      Post Anesthesia Care and Evaluation    Patient location during evaluation: bedside  Patient participation: complete - patient participated  Level of consciousness: awake  Pain score: 2  Pain management: adequate  Airway patency: patent  Anesthetic complications: No anesthetic complications    Cardiovascular status: acceptable  Respiratory status: acceptable  Hydration status: acceptable

## 2017-06-21 NOTE — ANESTHESIA PROCEDURE NOTES
Airway  Urgency: elective    Airway not difficult    General Information and Staff    Patient location during procedure: OR  Anesthesiologist: DANK SPARKS    Indications and Patient Condition  Indications for airway management: airway protection    Preoxygenated: yes  MILS maintained throughout  Mask difficulty assessment: 1 - vent by mask    Final Airway Details  Final airway type: endotracheal airway      Successful airway: ETT  Cuffed: yes   Successful intubation technique: direct laryngoscopy  Endotracheal tube insertion site: oral  Blade: Ashlee  Blade size: #3  ETT size: 8.0 mm  Cormack-Lehane Classification: grade IIa - partial view of glottis  Placement verified by: chest auscultation and capnometry   Measured from: lips  ETT to lips (cm): 23  Number of attempts at approach: 1

## 2017-06-21 NOTE — PLAN OF CARE
Problem: Patient Care Overview (Adult)  Goal: Plan of Care Review    06/21/17 1606   Coping/Psychosocial Response Interventions   Plan Of Care Reviewed With patient;spouse   Outcome Evaluation   Outcome Summary/Follow up Plan pt presents w, generalized weakness post op R TKR, fair tolerance to tsf OOB to chair w. rwx with cueing, may benefit from skilled PT acutely to address functional deficits prior to returning home planned tmorrow per pt report.          Problem: Inpatient Physical Therapy  Goal: Bed Mobility Goal LTG- PT    06/21/17 1606   Bed Mobility PT LTG   Bed Mobility PT LTG, Date Established 06/21/17   Bed Mobility PT LTG, Time to Achieve 2 days   Bed Mobility PT LTG, Activity Type all bed mobility   Bed Mobility PT LTG, [place] Level supervision required       Goal: Transfer Training Goal 1 LTG- PT    06/21/17 1606   Transfer Training PT LTG   Transfer Training PT LTG, Date Established 06/21/17   Transfer Training PT LTG, Time to Achieve 2 days   Transfer Training PT LTG, Activity Type all transfers   Transfer Training PT LTG, [place] Level supervision required   Transfer Training PT LTG, Assist Device walker, rolling       Goal: Gait Training Goal LTG- PT    06/21/17 1606   Gait Training PT LTG   Gait Training Goal PT LTG, Date Established 06/21/17   Gait Training Goal PT LTG, Time to Achieve 2 days   Gait Training Goal PT LTG, [place] Level contact guard assist   Gait Training Goal PT LTG, Assist Device walker, rolling   Gait Training Goal PT LTG, Distance to Achieve 100       Goal: Stair Training Goal LTG- PT    06/21/17 1606   Stair Training PT LTG   Stair Training Goal PT LTG, Date Established 06/21/17   Stair Training Goal PT LTG, Time to Achieve 2 days   Stair Training Goal PT LTG, Number of Steps 8   Stair Training Goal PT LTG, [place] Level contact guard assist   Stair Training Goal PT LTG, Assist Device 1 handrail       Goal: Range of Motion Goal LTG- PT    06/21/17 1606    Range of Motion PT LTG   Range of Motion Goal PT LTG, Date Established 06/21/17   Range of Motion Goal PT LTG, Time to Achieve 2 days   Range fo Motion Goal PT LTG, Joint R knee   Range of Motion Goal PT LTG, AROM Measure 5-85

## 2017-06-21 NOTE — ANESTHESIA PREPROCEDURE EVALUATION
Anesthesia Evaluation     no history of anesthetic complications:  NPO Solid Status: > 8 hours       Airway   Mallampati: II  no difficulty expected  Dental      Pulmonary - normal exam   (+) a smoker Current,   (-) COPD, asthma, sleep apnea    ROS comment: Patient counseled to stop smoking.    Cardiovascular - normal exam  Exercise tolerance: poor (<4 METS)    (+) hypertension, IBRAHIM,   (-) past MI, CAD, dysrhythmias, angina, cardiac stents      Neuro/Psych  (+) tremors, psychiatric history Anxiety,    GI/Hepatic/Renal/Endo    (+)  diabetes mellitus,   (-) no renal disease    Musculoskeletal     Abdominal    Substance History      OB/GYN          Other                                      Anesthesia Plan    ASA 3     general     Anesthetic plan and risks discussed with patient.

## 2017-06-21 NOTE — THERAPY EVALUATION
Acute Care - Physical Therapy Initial Evaluation  [facility]     Patient Name: Navdeep Christopher  : 1950  MRN: [id number]  Today's Date: 2017   Onset of Illness/Injury or Date of Surgery Date: 17  Date of Referral to PT: 17  Referring Physician: Diomedes      Admit Date: 2017     Visit Dx:    ICD-10-CM ICD-9-CM   1. Impaired gait and mobility R26.89 781.2     Patient Active Problem List   Diagnosis   • OA (osteoarthritis) of knee     Past Medical History:   Diagnosis Date   • Anxiety    • Arthritis    • Bone spur     BACK   • Diabetes mellitus     TYPE 2   • Hypertension    • Knee pain     RIGHT   • Low back pain    • Occasional tremors     BILATERAL HANDS   • Sciatica    • Unsteady gait     USES CANE     Past Surgical History:   Procedure Laterality Date   • KNEE ARTHROSCOPY W/ ACL RECONSTRUCTION Right    • IA TOTAL KNEE ARTHROPLASTY Left 2016    Procedure: LT TOTAL KNEE ARTHROPLASTY;  Surgeon: Joevany Hercules MD;  Location: [facility] OR;  Service: Orthopedics   • TONSILLECTOMY AND ADENOIDECTOMY            PT ASSESSMENT (last 72 hours)      PT Evaluation       17 1602 17 1532    Rehab Evaluation    Document Type evaluation  -     Subjective Information agree to therapy  -     Patient Effort, Rehab Treatment excellent  -     Symptoms Noted During/After Treatment none  -     General Information    Patient Profile Review yes  -LH     Onset of Illness/Injury or Date of Surgery Date 17  -     Referring Physician Diomedes  -     General Observations supine in bed no acute distress  -     Pertinent History Of Current Problem POD 0 TKR R  -LH     Precautions/Limitations fall precautions  -LH     Prior Level of Function independent:  -     Equipment Currently Used at Home  walker, standard;cane, straight  -KR    Benefits Reviewed patient:;spouse/S.O.:  -     Clinical Impression    Date of Referral to PT 17  -     Criteria for Skilled Therapeutic  Interventions Met yes  -     Pathology/Pathophysiology Noted (Describe Specifically for Each System) musculoskeletal  -     Impairments Found (describe specific impairments) ROM;joint integrity and mobility;gait, locomotion, and balance  -     Functional Limitations in Following Categories (Describe Specific Limitations) self-care;home management  -     Rehab Potential good, to achieve stated therapy goals  -     Pain Assessment    Pain Assessment No/denies pain  -     Vision Assessment/Intervention    Visual Impairment WFL with corrective lenses  -     Cognitive Assessment/Intervention    Current Cognitive/Communication Assessment functional  -     Orientation Status oriented x 4  -     Follows Commands/Answers Questions 100% of the time  -     ROM (Range of Motion)    General ROM no range of motion deficits identified  -     General ROM Detail except R knee  -     MMT (Manual Muscle Testing)    General MMT Assessment no strength deficits identified  -     General MMT Assessment Detail except R knee  -     Bed Mobility, Assessment/Treatment    Bed Mob, Supine to Sit, [place] minimum assist (75% patient effort);verbal cues required;2 person assist required  -     Bed Mob, Sit to Supine, [place] not tested  -     Transfer Assessment/Treatment    Transfers, Sit-Stand [place] minimum assist (75% patient effort);2 person assist required  -     Transfers, Stand-Sit [place] minimum assist (75% patient effort);2 person assist required  -     Transfers, Sit-Stand-Sit, Assist Device rolling walker  -     Transfer, Safety Issues step length decreased;weight-shifting ability decreased  -     Gait Assessment/Treatment    Gait, [place] Level minimum assist (75% patient effort);1 person + 1 person to manage equipment  -     Gait, Assistive Device rolling walker  -     Gait, Distance (Feet) 5  -     Gait, Gait Pattern Analysis swing-to gait  -     Gait, Gait  Deviations right:;antalgic  -     Gait, Impairments ROM decreased  -     Therapy Exercises    Exercise Protocols total knee  -     Total Knee Exercises right:;10 reps;completed protocol  -     Positioning and Restraints    Pre-Treatment Position in bed  -     Post Treatment Position chair  -     In Chair reclined;call light within reach;encouraged to call for assist;notified nsg;with family/caregiver  -       06/21/17 1013 06/21/17 0942    General Information    Equipment Currently Used at Home  cane, straight;walker, standard;raised toilet;shower chair;glucometer  -    Living Environment    Lives With  spouse  -    Living Arrangements  house  -    Home Accessibility  bed and bath are not on the first floor;stairs to enter home;stairs within home   PT HAS BEEN SLEEPING ON THE COUCH  -    Number of Stairs to Enter Home  8  -    Number of Stairs Within Home  24   TWO FLIGHTS-ONE TO BEDROOM UPSTAIRS, ONE TO BASEMENT.  -    Stair Railings at Home  inside, present at both sides;outside, present at both sides  -    Type of Financial/Environmental Concern  none  -    Transportation Available  car  -    Muscle Tone Assessment    Muscle Tone Assessment --   PT'S LEFT ARM TREMORS FOR THE LAST YEAR.  -       06/20/17 0707       General Information    Equipment Currently Used at Home cane, straight;walker, standard;raised toilet  -ME     Living Environment    Lives With spouse  -ME     Living Arrangements house  -ME     Home Accessibility bed not on first floor  -ME     Stair Railings at Home outside, present at both sides  -ME     Type of Financial/Environmental Concern none  -ME     Transportation Available car  -ME       User Key  (r) = Recorded By, (t) = Taken By, (c) = Cosigned By    Initials Name Provider Type     Ariana Love, PT Physical Therapist    ME Cecily Rangel, RN Registered Nurse    ABDULAZIZ Hoffmann, RN Registered Nurse     Riddhi Neely, RN Registered Nurse           Physical Therapy Education     Title: PT OT SLP Therapies (Active)     Topic: Physical Therapy (Active)     Point: Mobility training (Active)    Learning Progress Summary    Learner Readiness Method Response Comment Documented by Status   Patient Acceptance E NR   06/21/17 1606 Active               Point: Home exercise program (Active)    Learning Progress Summary    Learner Readiness Method Response Comment Documented by Status   Patient Acceptance E NR   06/21/17 1606 Active                      User Key     Initials Effective Dates Name Provider Type Discipline     02/07/17 -  Ariana Love, PT Physical Therapist PT                PT Recommendation and Plan  Anticipated Discharge Disposition: home with 24/7 care, home with home health  Planned Therapy Interventions: balance training, bed mobility training, gait training, home exercise program, ROM (Range of Motion), stair training, strengthening, stretching, transfer training  PT Frequency: 2 times/day  Plan of Care Review  Plan Of Care Reviewed With: patient, spouse  Outcome Summary/Follow up Plan: pt presents w, generalized weakness post op R TKR, fair tolerance to tsf OOB to chair w. rwx with cueing, may benefit from skilled PT acutely to address functional deficits prior to returning home planned tmorrow per pt report.           IP PT Goals       06/21/17 1606          Bed Mobility PT LTG    Bed Mobility PT LTG, Date Established 06/21/17  -      Bed Mobility PT LTG, Time to Achieve 2 days  -LH      Bed Mobility PT LTG, Activity Type all bed mobility  -      Bed Mobility PT LTG, [place] Level supervision required  -      Transfer Training PT LTG    Transfer Training PT LTG, Date Established 06/21/17  -      Transfer Training PT LTG, Time to Achieve 2 days  -      Transfer Training PT LTG, Activity Type all transfers  -      Transfer Training PT LTG, [place] Level supervision required  -      Transfer Training PT LTG, Assist  Device walker, rolling  -LH      Gait Training PT LTG    Gait Training Goal PT LTG, Date Established 06/21/17  -      Gait Training Goal PT LTG, Time to Achieve 2 days  -LH      Gait Training Goal PT LTG, [place] Level contact guard assist  -LH      Gait Training Goal PT LTG, Assist Device walker, rolling  -LH      Gait Training Goal PT LTG, Distance to Achieve 100  -LH      Stair Training PT LTG    Stair Training Goal PT LTG, Date Established 06/21/17  -LH      Stair Training Goal PT LTG, Time to Achieve 2 days  -LH      Stair Training Goal PT LTG, Number of Steps 8  -LH      Stair Training Goal PT LTG, [place] Level contact guard assist  -LH      Stair Training Goal PT LTG, Assist Device 1 handrail  -LH      Range of Motion PT LTG    Range of Motion Goal PT LTG, Date Established 06/21/17  -LH      Range of Motion Goal PT LTG, Time to Achieve 2 days  -LH      Range fo Motion Goal PT LTG, Joint R knee  -LH      Range of Motion Goal PT LTG, AROM Measure 5-85  -        User Key  (r) = Recorded By, (t) = Taken By, (c) = Cosigned By    Initials Name Provider Type     Ariana Love PT Physical Therapist                Outcome Measures       06/21/17 1600          How much help from another person do you currently need...    Turning from your back to your side while in flat bed without using bedrails? 3  -LH      Moving from lying on back to sitting on the side of a flat bed without bedrails? 3  -LH      Moving to and from a bed to a chair (including a wheelchair)? 3  -LH      Standing up from a chair using your arms (e.g., wheelchair, bedside chair)? 3  -LH      Climbing 3-5 steps with a railing? 2  -LH      To walk in hospital room? 3  -LH      AM-PAC 6 Clicks Score 17  -      Functional Assessment    Outcome Measure Options AM-PAC 6 Clicks Basic Mobility (PT)  -        User Key  (r) = Recorded By, (t) = Taken By, (c) = Cosigned By    Initials Name Provider Type    VISHAL Love PT Physical  Therapist           Time Calculation:         PT Charges       06/21/17 1610          Time Calculation    Start Time 1545  -      Stop Time 1610  -      Time Calculation (min) 25 min  -      PT Received On 06/21/17  -      PT - Next Appointment 06/22/17  -      PT Goal Re-Cert Due Date 06/23/17  -        User Key  (r) = Recorded By, (t) = Taken By, (c) = Cosigned By    Initials Name Provider Type     Ariana Love, PT Physical Therapist          Therapy Charges for Today     Code Description Service Date Service Provider Modifiers Qty    07094222879 HC PT EVAL LOW COMPLEXITY 2 6/21/2017 Ariana Love, PT GP 1          PT G-Codes  Outcome Measure Options: AM-PAC 6 Clicks Basic Mobility (PT)      Ariana Love PT  6/21/2017

## 2017-06-21 NOTE — OP NOTE
Date of Procedure:  06/21/2017     PREOPERATIVE DIAGNOSIS:  Severe degenerative arthritis, posttraumatic, right knee.    POSTOPERATIVE DIAGNOSIS: Severe degenerative arthritis, posttraumatic, right knee.     PROCEDURE PERFORMED: Total knee arthroplasty, right, ConforMIS custom.     SURGEON: Jeovany Khanna MD    ASSISTANT:  Pawan Guerra MD     ANESTHESIA: General.     COMPLICATIONS: None.     DRAIN: Hemovac.     ESTIMATED BLOOD LOSS: Less than 150 mL.     DESCRIPTION OF PROCEDURE: Under satisfactory general anesthetic, the right leg was prepped and draped in the usual manner. Tourniquet inflated to 250 mmHg.  A curved medial incision was made, but mainly over the medial side of the patella. He had an old scar from a medial collateral and cruciate injury years ago over the medial side of the joint. Dissection was made medial and then a medial parapatellar approach carried out. The patella was quite tight. There are multiple adhesions in the joint. It was inverted 90° and debrided from 23 to 16 mm.  Three drill holes were made for anchorage of a 32 mm low-profile patella. The lateral facet was excised. Patellar guard was applied, slid into the lateral recess, and the knee was flexed. The custom jigs of the ConforMIS system were applied to the femur first, then the tibia. Appropriate cuts were made with a +2 additional off of the tibia.  Central punch was inserted through the tibial baseplate in the appropriate external rotation. Trial revealed almost 0° to 120°. There was excellent tracking  and stability both in flexion and extension.     All trial complements were removed. The posterior capsule was infiltrated with Exparel. Hemostasis was obtained with the tourniquet down using the Bovie. The knee was now washed and irrigated copiously with antibiotic solution. One batch of [place] cement was made and the tibia component was cemented in place. While this was curing, another batch of [place] cement was made and  the femoral and patella were cemented in place. The polyethylene PDS tray was locked into place before extending.  The knee had almost 0° to 120° with excellent tracking and alignment. The wound was irrigated copiously with antibiotic solution.  Exparel was injected into the deep fascia and subcutaneous. When the tourniquet was released anesthesia gave him tranexamic acid IV 1 g.      After the knee was washed and irrigated again Hemovac drain inserted,  closure was carried out with the knee in flexion and dressing applied. He was taken to the recovery room in satisfactory condition.       Jeovany Hercules M.D.  EAE:ирина  D:   06/21/2017 13:24:10  T:   06/21/2017 14:05:12  Job ID:   [id number]  Document ID:   [id number]  cc:

## 2017-06-21 NOTE — PLAN OF CARE
Problem: Patient Care Overview (Adult)  Goal: Plan of Care Review  Outcome: Ongoing (interventions implemented as appropriate)    06/21/17 1849   Coping/Psychosocial Response Interventions   Plan Of Care Reviewed With patient   Patient Care Overview   Progress improving   Outcome Evaluation   Outcome Summary/Follow up Plan patient doing well post op. VSS. DTV at 2120. up to chair and ambulated in room. 200 out of drain. PO pain meds changed and valim ordered. PO pain meds controlling pain so far. discussed with educated patient on BP monitoring and medications as well glucose monitoring and DM meds.          Problem: Perioperative Period (Adult)  Goal: Signs and Symptoms of Listed Potential Problems Will be Absent or Manageable (Perioperative Period)  Outcome: Ongoing (interventions implemented as appropriate)    Problem: Knee Replacement, Total (Adult)  Goal: Signs and Symptoms of Listed Potential Problems Will be Absent or Manageable (Knee Replacement, Total)  Outcome: Ongoing (interventions implemented as appropriate)    Problem: Fall Risk (Adult)  Goal: Identify Related Risk Factors and Signs and Symptoms  Outcome: Outcome(s) achieved Date Met:  06/21/17  Goal: Absence of Falls  Outcome: Ongoing (interventions implemented as appropriate)

## 2017-06-21 NOTE — PLAN OF CARE
"Problem: Patient Care Overview (Adult)  Goal: Plan of Care Review  Outcome: Ongoing (interventions implemented as appropriate)    06/21/17 1013   Coping/Psychosocial Response Interventions   Plan Of Care Reviewed With patient   Patient Care Overview   Progress progress toward functional goals as expected       Goal: Adult Individualization and Mutuality  Outcome: Ongoing (interventions implemented as appropriate)    06/21/17 1013   Individualization   Patient Specific Preferences PT PREFERS TO BE CALLED 'FOREST\"    Patient Specific Goals TO BE RELAXED FOR SX TODAY.   Patient Specific Interventions TO GIVE RELAXING MED PER AA ORDR   Mutuality/Individual Preferences   What Anxieties, Fears or Concerns Do You Have About Your Health or Care? NONE AT THIS TIME.    What Questions Do You Have About Your Health or Care? NONE AT THIS TIME.    What Information Would Help Us Give You More Personalized Care? SEE ABOVE.       Goal: Discharge Needs Assessment  Outcome: Ongoing (interventions implemented as appropriate)    Problem: Perioperative Period (Adult)  Goal: Signs and Symptoms of Listed Potential Problems Will be Absent or Manageable (Perioperative Period)  Outcome: Ongoing (interventions implemented as appropriate)    06/21/17 1013   Perioperative Period   Problems Assessed (Perioperative Period) all   Problems Present (Perioperative Period) pain           "

## 2017-06-22 ENCOUNTER — APPOINTMENT (OUTPATIENT)
Dept: CARDIOLOGY | Facility: HOSPITAL | Age: 67
End: 2017-06-22
Attending: ORTHOPAEDIC SURGERY

## 2017-06-22 LAB
GLUCOSE BLDC GLUCOMTR-MCNC: 147 MG/DL (ref 70–130)
GLUCOSE BLDC GLUCOMTR-MCNC: 216 MG/DL (ref 70–130)
GLUCOSE BLDC GLUCOMTR-MCNC: 290 MG/DL (ref 70–130)
GLUCOSE BLDC GLUCOMTR-MCNC: 64 MG/DL (ref 70–130)
GLUCOSE BLDC GLUCOMTR-MCNC: 72 MG/DL (ref 70–130)
HCT VFR BLD AUTO: 35.1 % (ref 40.4–52.2)
HGB BLD-MCNC: 11.8 G/DL (ref 13.7–17.6)

## 2017-06-22 PROCEDURE — 25010000003 CEFAZOLIN IN DEXTROSE 2-4 GM/100ML-% SOLUTION: Performed by: ORTHOPAEDIC SURGERY

## 2017-06-22 PROCEDURE — 94799 UNLISTED PULMONARY SVC/PX: CPT

## 2017-06-22 PROCEDURE — 25010000002 KETOROLAC TROMETHAMINE PER 15 MG: Performed by: ORTHOPAEDIC SURGERY

## 2017-06-22 PROCEDURE — 97150 GROUP THERAPEUTIC PROCEDURES: CPT

## 2017-06-22 PROCEDURE — 85014 HEMATOCRIT: CPT | Performed by: ORTHOPAEDIC SURGERY

## 2017-06-22 PROCEDURE — 82962 GLUCOSE BLOOD TEST: CPT

## 2017-06-22 PROCEDURE — 25010000002 ENOXAPARIN PER 10 MG: Performed by: ORTHOPAEDIC SURGERY

## 2017-06-22 PROCEDURE — 63710000001 INSULIN ISOPHANE HUMAN PER 5 UNITS: Performed by: ORTHOPAEDIC SURGERY

## 2017-06-22 PROCEDURE — 97110 THERAPEUTIC EXERCISES: CPT

## 2017-06-22 PROCEDURE — 93971 EXTREMITY STUDY: CPT

## 2017-06-22 PROCEDURE — 85018 HEMOGLOBIN: CPT | Performed by: ORTHOPAEDIC SURGERY

## 2017-06-22 RX ADMIN — MUPIROCIN 1 APPLICATION: 20 OINTMENT TOPICAL at 17:36

## 2017-06-22 RX ADMIN — DOCUSATE SODIUM 100 MG: 100 CAPSULE, LIQUID FILLED ORAL at 09:35

## 2017-06-22 RX ADMIN — OXYCODONE HYDROCHLORIDE AND ACETAMINOPHEN 2 TABLET: 10; 325 TABLET ORAL at 08:35

## 2017-06-22 RX ADMIN — KETOROLAC TROMETHAMINE 15 MG: 30 INJECTION, SOLUTION INTRAMUSCULAR at 10:21

## 2017-06-22 RX ADMIN — METFORMIN HYDROCHLORIDE 1000 MG: 1000 TABLET ORAL at 08:34

## 2017-06-22 RX ADMIN — KETOROLAC TROMETHAMINE 15 MG: 30 INJECTION, SOLUTION INTRAMUSCULAR at 03:46

## 2017-06-22 RX ADMIN — DIAZEPAM 5 MG: 5 TABLET ORAL at 16:38

## 2017-06-22 RX ADMIN — OXYCODONE HYDROCHLORIDE AND ACETAMINOPHEN 2 TABLET: 10; 325 TABLET ORAL at 16:38

## 2017-06-22 RX ADMIN — ENOXAPARIN SODIUM 30 MG: 30 INJECTION SUBCUTANEOUS at 08:34

## 2017-06-22 RX ADMIN — GABAPENTIN 600 MG: 300 CAPSULE ORAL at 20:51

## 2017-06-22 RX ADMIN — OXYCODONE HYDROCHLORIDE AND ACETAMINOPHEN 2 TABLET: 10; 325 TABLET ORAL at 12:37

## 2017-06-22 RX ADMIN — CEFAZOLIN SODIUM 2 G: 2 INJECTION, SOLUTION INTRAVENOUS at 03:46

## 2017-06-22 RX ADMIN — MUPIROCIN 1 APPLICATION: 20 OINTMENT TOPICAL at 08:33

## 2017-06-22 RX ADMIN — OXYCODONE HYDROCHLORIDE AND ACETAMINOPHEN 2 TABLET: 10; 325 TABLET ORAL at 20:51

## 2017-06-22 RX ADMIN — OXYCODONE HYDROCHLORIDE AND ACETAMINOPHEN 2 TABLET: 10; 325 TABLET ORAL at 03:46

## 2017-06-22 RX ADMIN — HUMAN INSULIN 70 UNITS: 100 INJECTION, SUSPENSION SUBCUTANEOUS at 08:33

## 2017-06-22 RX ADMIN — DIAZEPAM 5 MG: 5 TABLET ORAL at 06:35

## 2017-06-22 NOTE — THERAPY TREATMENT NOTE
Acute Care - Physical Therapy Treatment Note  [facility]     Patient Name: Navdeep Christopher  : 1950  MRN: [id number]  Today's Date: 2017  Onset of Illness/Injury or Date of Surgery Date: 17  Date of Referral to PT: 17  Referring Physician: Diomedes    Admit Date: 2017    Visit Dx:    ICD-10-CM ICD-9-CM   1. Impaired gait and mobility R26.89 781.2     Patient Active Problem List   Diagnosis   • OA (osteoarthritis) of knee               Adult Rehabilitation Note       17 1200          Rehab Assessment/Intervention    Discipline physical therapy assistant  -CW      Document Type therapy note (daily note)  -CW      Subjective Information agree to therapy;complains of;weakness;fatigue;pain  -CW      Patient Effort, Rehab Treatment good  -CW      Precautions/Limitations fall precautions  -CW      Recorded by [CW] Levy Bowers      Pain Assessment    Pain Assessment 0-10  -CW      Pain Score 7  -CW      Post Pain Score 7  -CW      Pain Type Surgical pain  -CW      Pain Location Knee  -CW      Pain Orientation Right  -CW      Pain Intervention(s) Repositioned;Ambulation/increased activity  -CW      Response to Interventions rosalinda  -CW      Recorded by [CW] Levy Bowers      Cognitive Assessment/Intervention    Current Cognitive/Communication Assessment functional  -CW      Orientation Status oriented x 4  -CW      Follows Commands/Answers Questions 100% of the time  -CW      Personal Safety WNL/WFL  -CW      Personal Safety Interventions fall prevention program maintained;gait belt;muscle strengthening facilitated;nonskid shoes/slippers when out of bed  -CW      Recorded by [CW] Levy Bowers      ROM (Range of Motion)    General ROM Detail 9-90  -CW      Recorded by [CW] Levy Bowers      Bed Mobility, Assessment/Treatment    Bed Mob, Sit to Supine, [place] not tested  -CW      Bed Mobility, Comment in chair  -CW      Recorded by [CW] Levy Bowers       Transfer Assessment/Treatment    Transfers, Sit-Stand [place] contact guard assist  -CW      Transfers, Stand-Sit [place] contact guard assist  -CW      Transfers, Sit-Stand-Sit, Assist Device rolling walker  -CW      Recorded by [INNA] Levy Bowers      Gait Assessment/Treatment    Gait, [place] Level contact guard assist  -CW      Gait, Assistive Device rolling walker  -CW      Gait, Distance (Feet) 100  -CW      Gait, Gait Pattern Analysis swing-to gait  -CW      Gait, Gait Deviations alan decreased;step length decreased;stride length decreased  -CW      Gait, Impairments ROM decreased;pain  -CW      Recorded by [INNA] Levy Bowers      Therapy Exercises    Exercise Protocols total knee  -CW      Total Knee Exercises right:;15 reps;completed protocol  -CW      Recorded by [INNA] Levy Bowers      Positioning and Restraints    Pre-Treatment Position sitting in chair/recliner  -CW      Post Treatment Position chair  -CW      In Chair notified nsg;reclined;call light within reach;encouraged to call for assist  -CW      Recorded by [INNA] Levy Bowers        User Key  (r) = Recorded By, (t) = Taken By, (c) = Cosigned By    Initials Name Effective Dates    INNA Bowers 12/13/16 -                 IP PT Goals       06/21/17 1606          Bed Mobility PT LTG    Bed Mobility PT LTG, Date Established 06/21/17  -      Bed Mobility PT LTG, Time to Achieve 2 days  -      Bed Mobility PT LTG, Activity Type all bed mobility  -      Bed Mobility PT LTG, [place] Level supervision required  -      Transfer Training PT LTG    Transfer Training PT LTG, Date Established 06/21/17  -      Transfer Training PT LTG, Time to Achieve 2 days  -      Transfer Training PT LTG, Activity Type all transfers  -      Transfer Training PT LTG, [place] Level supervision required  -      Transfer Training PT LTG, Assist Device walker, rolling  -LH      Gait Training PT LTG    Gait  Training Goal PT LTG, Date Established 06/21/17  -      Gait Training Goal PT LTG, Time to Achieve 2 days  -      Gait Training Goal PT LTG, [place] Level contact guard assist  -      Gait Training Goal PT LTG, Assist Device walker, rolling  -      Gait Training Goal PT LTG, Distance to Achieve 100  -      Stair Training PT LTG    Stair Training Goal PT LTG, Date Established 06/21/17  -      Stair Training Goal PT LTG, Time to Achieve 2 days  -LH      Stair Training Goal PT LTG, Number of Steps 8  -      Stair Training Goal PT LTG, [place] Level contact guard assist  -      Stair Training Goal PT LTG, Assist Device 1 handrail  -      Range of Motion PT LTG    Range of Motion Goal PT LTG, Date Established 06/21/17  -      Range of Motion Goal PT LTG, Time to Achieve 2 days  -LH      Range fo Motion Goal PT LTG, Joint R knee  -      Range of Motion Goal PT LTG, AROM Measure 5-85  -        User Key  (r) = Recorded By, (t) = Taken By, (c) = Cosigned By    Initials Name Provider Type     Ariana Love, PT Physical Therapist          Physical Therapy Education     Title: PT OT SLP Therapies (Done)     Topic: Physical Therapy (Done)     Point: Mobility training (Done)    Learning Progress Summary    Learner Readiness Method Response Comment Documented by Status   Patient Acceptance DICK ASENCIO VU   06/22/17 1232 Done    Acceptance E NR   06/21/17 1606 Active               Point: Home exercise program (Done)    Learning Progress Summary    Learner Readiness Method Response Comment Documented by Status   Patient Acceptance DICK ASENCIO VU   06/22/17 1232 Done    Acceptance E NR   06/21/17 1606 Active                      User Key     Initials Effective Dates Name Provider Type Discipline     02/07/17 -  Ariana Love, PT Physical Therapist PT     12/13/16 -  Levy Bowers Physical Therapy Assistant PT                    PT Recommendation and Plan  Anticipated Discharge Disposition:  home with /7 care, home with home health  Planned Therapy Interventions: balance training, bed mobility training, gait training, home exercise program, ROM (Range of Motion), stair training, strengthening, stretching, transfer training  PT Frequency: 2 times/day  Plan of Care Review  Plan Of Care Reviewed With: patient  Progress: improving  Outcome Summary/Follow up Plan: Pt increasing with strength and balance with use of RWX          Outcome Measures       06/22/17 1200 06/21/17 1600       How much help from another person do you currently need...    Turning from your back to your side while in flat bed without using bedrails? 3  -CW 3  -LH     Moving from lying on back to sitting on the side of a flat bed without bedrails? 3  -CW 3  -LH     Moving to and from a bed to a chair (including a wheelchair)? 3  -CW 3  -LH     Standing up from a chair using your arms (e.g., wheelchair, bedside chair)? 3  -CW 3  -LH     Climbing 3-5 steps with a railing? 2  -CW 2  -LH     To walk in hospital room? 3  -CW 3  -LH     AM-PAC 6 Clicks Score 17  -CW 17  -LH     Functional Assessment    Outcome Measure Options AM-PAC 6 Clicks Basic Mobility (PT)  -CW AM-PAC 6 Clicks Basic Mobility (PT)  -LH       User Key  (r) = Recorded By, (t) = Taken By, (c) = Cosigned By    Initials Name Provider Type     Ariana Love, PT Physical Therapist    CW Levy Bowers Physical Therapy Assistant           Time Calculation:         PT Charges       06/22/17 1233          Time Calculation    Start Time 1030  -CW      Stop Time 1122  -CW      Time Calculation (min) 52 min  -CW      PT Received On 06/22/17  -CW      PT - Next Appointment 06/22/17  -CW        User Key  (r) = Recorded By, (t) = Taken By, (c) = Cosigned By    Initials Name Provider Type    CW Levy Bowers Physical Therapy Assistant          Therapy Charges for Today     Code Description Service Date Service Provider Modifiers Qty    34005198160  PT THER PROC GROUP  6/22/2017 Levy Bowers GP 1    96232517598 HC PT THER PROC EA 15 MIN 6/22/2017 Levy Bowers GP 1          PT G-Codes  Outcome Measure Options: AM-PAC 6 Clicks Basic Mobility (PT)    Levy Bowers  6/22/2017

## 2017-06-22 NOTE — PLAN OF CARE
Problem: Patient Care Overview (Adult)  Goal: Plan of Care Review  Outcome: Ongoing (interventions implemented as appropriate)    06/22/17 1503   Coping/Psychosocial Response Interventions   Plan Of Care Reviewed With patient   Patient Care Overview   Progress improving   Outcome Evaluation   Outcome Summary/Follow up Plan Patient tolerating pain with po MEDS, ambulates well, VSS HTN controlled       Goal: Discharge Needs Assessment  Outcome: Ongoing (interventions implemented as appropriate)    Problem: Perioperative Period (Adult)  Goal: Signs and Symptoms of Listed Potential Problems Will be Absent or Manageable (Perioperative Period)  Outcome: Ongoing (interventions implemented as appropriate)    06/22/17 1503   Perioperative Period   Problems Assessed (Perioperative Period) pain   Problems Present (Perioperative Period) pain         Problem: Knee Replacement, Total (Adult)  Goal: Signs and Symptoms of Listed Potential Problems Will be Absent or Manageable (Knee Replacement, Total)  Outcome: Ongoing (interventions implemented as appropriate)    06/22/17 1503   Knee Replacement, Total   Problems Assessed (Total Knee Replacement) all   Problems Present (Total Knee Replacement) pain;decreased range of motion         Problem: Fall Risk (Adult)  Goal: Absence of Falls  Outcome: Ongoing (interventions implemented as appropriate)    06/22/17 1503   Fall Risk (Adult)   Absence of Falls achieves outcome

## 2017-06-22 NOTE — PROGRESS NOTES
Discharge Planning Assessment  [facility]     Patient Name: Navdeep Christopher  MRN: [id number]  Today's Date: 6/22/2017    Admit Date: 6/21/2017          Discharge Needs Assessment       06/22/17 1608    Living Environment    Lives With spouse    Living Arrangements house    Transportation Available car;family or friend will provide    Discharge Needs Assessment    Concerns To Be Addressed basic needs concerns    Readmission Within The Last 30 Days no previous admission in last 30 days    Anticipated Changes Related to Illness none    Equipment Currently Used at Home walker, standard;cane, straight    Discharge Facility/Level Of Care Needs home with home health            Discharge Plan       06/22/17 1610    Case Management/Social Work Plan    Plan [facility]    Patient/Family In Agreement With Plan yes    Additional Comments Spoke with pt, verified correct information on facesheet and explained the role of CCP. Pt would like to d/c home with [facility], referral given to Ariana with [facility] who states they are able to accept. Plan will be to d/c home with HH and family support.        Discharge Placement     Facility/Agency Request Status Selected? Address Phone Number Fax Number    [facility] Accepted    [address] [address] [phone]                Demographic Summary     None            Functional Status     None            Psychosocial     None            Abuse/Neglect     None            Legal     None            Substance Abuse     None            Patient Forms     None          Merary Rebolledo RN

## 2017-06-22 NOTE — PLAN OF CARE
Problem: Patient Care Overview (Adult)  Goal: Plan of Care Review  Outcome: Ongoing (interventions implemented as appropriate)    06/22/17 0354   Coping/Psychosocial Response Interventions   Plan Of Care Reviewed With patient   Patient Care Overview   Progress improving   Outcome Evaluation   Outcome Summary/Follow up Plan pain under control with pain meds. vitals stable. ambulates with assist x1. voiding without difficulty. .educated patient on monitoring blood pressure and blood sugar given h/o diabetes and hypertension.       Goal: Adult Individualization and Mutuality  Outcome: Ongoing (interventions implemented as appropriate)  Goal: Discharge Needs Assessment  Outcome: Ongoing (interventions implemented as appropriate)    Problem: Perioperative Period (Adult)  Goal: Signs and Symptoms of Listed Potential Problems Will be Absent or Manageable (Perioperative Period)  Outcome: Ongoing (interventions implemented as appropriate)    Problem: Knee Replacement, Total (Adult)  Goal: Signs and Symptoms of Listed Potential Problems Will be Absent or Manageable (Knee Replacement, Total)  Outcome: Ongoing (interventions implemented as appropriate)    Problem: Fall Risk (Adult)  Goal: Absence of Falls  Outcome: Ongoing (interventions implemented as appropriate)

## 2017-06-22 NOTE — PLAN OF CARE
Problem: Patient Care Overview (Adult)  Goal: Plan of Care Review  Outcome: Ongoing (interventions implemented as appropriate)    06/22/17 1232   Coping/Psychosocial Response Interventions   Plan Of Care Reviewed With patient   Patient Care Overview   Progress improving   Outcome Evaluation   Outcome Summary/Follow up Plan Pt increasing with strength and balance with use of RWX

## 2017-06-22 NOTE — THERAPY TREATMENT NOTE
Acute Care - Physical Therapy Treatment Note  [facility]     Patient Name: Navdeep Christopher  : 1950  MRN: [id number]  Today's Date: 2017  Onset of Illness/Injury or Date of Surgery Date: 17  Date of Referral to PT: 17  Referring Physician: Diomedes    Admit Date: 2017    Visit Dx:    ICD-10-CM ICD-9-CM   1. Impaired gait and mobility R26.89 781.2     Patient Active Problem List   Diagnosis   • OA (osteoarthritis) of knee               Adult Rehabilitation Note       17 1400 17 1200       Rehab Assessment/Intervention    Discipline physical therapy assistant  -JM physical therapy assistant  -CW     Document Type therapy note (daily note)  - therapy note (daily note)  -     Subjective Information agree to therapy;complains of;weakness;fatigue;pain;swelling  - agree to therapy;complains of;weakness;fatigue;pain  -CW     Patient Effort, Rehab Treatment  good  -CW     Precautions/Limitations fall precautions  - fall precautions  -CW     Specific Treatment Considerations pt has resting tremors, celestina left hand  -      Recorded by [JM] Anita Rivera PTA [CW] Levy Bowers     Pain Assessment    Pain Assessment 0-10  - 0-10  -CW     Pain Score 7  -JM 7  -CW     Post Pain Score  7  -CW     Pain Type Surgical pain  - Surgical pain  -CW     Pain Location Knee  - Knee  -CW     Pain Orientation Right  - Right  -CW     Pain Intervention(s) Medication (See MAR);Repositioned;Cold applied  - Repositioned;Ambulation/increased activity  -CW     Response to Interventions rosalinda, unchanged   - rosalinda  -CW     Recorded by [ILAN] Anita Rivera PTA [CW] Levy Bowers     Cognitive Assessment/Intervention    Current Cognitive/Communication Assessment  functional  -CW     Orientation Status  oriented x 4  -CW     Follows Commands/Answers Questions  100% of the time  -CW     Personal Safety  WNL/WFL  -CW     Personal Safety Interventions  fall prevention program maintained;gait  belt;muscle strengthening facilitated;nonskid shoes/slippers when out of bed  -CW     Recorded by  [CW] Levy Bowers     ROM (Range of Motion)    General ROM Detail  9-90  -CW     Recorded by  [CW] Levy Bowers     Bed Mobility, Assessment/Treatment    Bed Mobility, Scoot/Bridge, [place] conditional independence  -      Bed Mob, Sit to Supine, [place]  not tested  -CW     Bed Mobility, Comment in chair  - in chair  -CW     Recorded by [JM] Anita Rivera PTA [CW] Levy Bowers     Transfer Assessment/Treatment    Transfers, Sit-Stand [place] stand by assist;verbal cues required  - contact guard assist  -CW     Transfers, Stand-Sit [place]  contact guard assist  -CW     Transfers, Sit-Stand-Sit, Assist Device  rolling walker  -     Transfer, Comment slight forw flexed, post lean  -      Recorded by [JM] Anita Rivera PTA [CW] Levy Bowers     Gait Assessment/Treatment    Gait, [place] Level contact guard assist;verbal cues required  - contact guard assist  -     Gait, Assistive Device rolling walker  - rolling walker  -CW     Gait, Distance (Feet) 70  -  -CW     Gait, Gait Pattern Analysis  swing-to gait  -     Gait, Gait Deviations alan decreased;forward flexed posture;step length decreased  - alan decreased;step length decreased;stride length decreased  -     Gait, Impairments decreased flexibility;strength decreased;pain  - ROM decreased;pain  -CW     Recorded by [JM] Anita Rivera PTA [CW] Levy Bowers     Therapy Exercises    Exercise Protocols total knee  -JM total knee  -CW     Total Knee Exercises right:;20 reps;completed protocol  - right:;15 reps;completed protocol  -CW     Recorded by [JM] Anita Rivera PTA [CW] Levy Bowers     Positioning and Restraints    Pre-Treatment Position sitting in chair/recliner  - sitting in chair/recliner  -CW     Post Treatment Position  chair  -CW     In Chair  reclined;call light within reach;encouraged to call for assist;with nsg  - notified nsg;reclined;call light within reach;encouraged to call for assist  -CW     Recorded by [ILAN] Anita Rivera PTA [CW] Levy Bowers       User Key  (r) = Recorded By, (t) = Taken By, (c) = Cosigned By    Initials Name Effective Dates    ILAN Rivera PTA 02/18/16 -     CW Levy Bowers 12/13/16 -                 IP PT Goals       06/21/17 1606          Bed Mobility PT LTG    Bed Mobility PT LTG, Date Established 06/21/17  -LH      Bed Mobility PT LTG, Time to Achieve 2 days  -LH      Bed Mobility PT LTG, Activity Type all bed mobility  -LH      Bed Mobility PT LTG, [place] Level supervision required  -      Transfer Training PT LTG    Transfer Training PT LTG, Date Established 06/21/17  -      Transfer Training PT LTG, Time to Achieve 2 days  -LH      Transfer Training PT LTG, Activity Type all transfers  -      Transfer Training PT LTG, [place] Level supervision required  -      Transfer Training PT LTG, Assist Device walker, rolling  -LH      Gait Training PT LTG    Gait Training Goal PT LTG, Date Established 06/21/17  -      Gait Training Goal PT LTG, Time to Achieve 2 days  -LH      Gait Training Goal PT LTG, [place] Level contact guard assist  -      Gait Training Goal PT LTG, Assist Device walker, rolling  -LH      Gait Training Goal PT LTG, Distance to Achieve 100  -LH      Stair Training PT LTG    Stair Training Goal PT LTG, Date Established 06/21/17  -      Stair Training Goal PT LTG, Time to Achieve 2 days  -LH      Stair Training Goal PT LTG, Number of Steps 8  -LH      Stair Training Goal PT LTG, [place] Level contact guard assist  -      Stair Training Goal PT LTG, Assist Device 1 handrail  -LH      Range of Motion PT LTG    Range of Motion Goal PT LTG, Date Established 06/21/17  -      Range of Motion Goal PT LTG, Time to Achieve 2 days  -LH      Range fo Motion  Goal PT LTG, Joint R knee  -LH      Range of Motion Goal PT LTG, AROM Measure 5-85  -LH        User Key  (r) = Recorded By, (t) = Taken By, (c) = Cosigned By    Initials Name Provider Type     Ariana Love, PT Physical Therapist          Physical Therapy Education     Title: PT OT SLP Therapies (Done)     Topic: Physical Therapy (Done)     Point: Mobility training (Done)    Learning Progress Summary    Learner Readiness Method Response Comment Documented by Status   Patient Acceptance E,TB DU,VU   06/22/17 1232 Done    Acceptance E NR   06/21/17 1606 Active               Point: Home exercise program (Done)    Learning Progress Summary    Learner Readiness Method Response Comment Documented by Status   Patient Acceptance E,TB DU,VU   06/22/17 1232 Done    Acceptance E NR   06/21/17 1606 Active                      User Key     Initials Effective Dates Name Provider Type [facility] 02/07/17 -  Ariana Love, PT Physical Therapist PT     12/13/16 -  Levy Bowers Physical Therapy Assistant PT                    PT Recommendation and Plan  Anticipated Discharge Disposition: home with /7 care, home with home health  Planned Therapy Interventions: balance training, bed mobility training, gait training, home exercise program, ROM (Range of Motion), stair training, strengthening, stretching, transfer training  PT Frequency: 2 times/day             Outcome Measures       06/22/17 1200 06/21/17 1600       How much help from another person do you currently need...    Turning from your back to your side while in flat bed without using bedrails? 3  -CW 3  -LH     Moving from lying on back to sitting on the side of a flat bed without bedrails? 3  -CW 3  -LH     Moving to and from a bed to a chair (including a wheelchair)? 3  -CW 3  -LH     Standing up from a chair using your arms (e.g., wheelchair, bedside chair)? 3  -CW 3  -LH     Climbing 3-5 steps with a railing? 2  -CW 2  -LH     To walk in hospital  room? 3  -CW 3  -LH     AM-PAC 6 Clicks Score 17  -CW 17  -LH     Functional Assessment    Outcome Measure Options AM-PAC 6 Clicks Basic Mobility (PT)  -CW AM-PAC 6 Clicks Basic Mobility (PT)  -       User Key  (r) = Recorded By, (t) = Taken By, (c) = Cosigned By    Initials Name Provider Type     Ariana Love, PT Physical Therapist    CW Levy Bowers Physical Therapy Assistant           Time Calculation:         PT Charges       06/22/17 1507 06/22/17 1233       Time Calculation    Start Time 1410  - 1030  -     Stop Time 1455  - 1122  -     Time Calculation (min) 45 min  - 52 min  -CW     PT Received On 06/22/17  - 06/22/17  -     PT - Next Appointment 06/23/17  - 06/22/17  -       User Key  (r) = Recorded By, (t) = Taken By, (c) = Cosigned By    Initials Name Provider Type     Anita Rivera PTA Physical Therapy Assistant    CW Levy Bowers Physical Therapy Assistant          Therapy Charges for Today     Code Description Service Date Service Provider Modifiers Qty    35492978213 HC PT THER PROC EA 15 MIN 6/22/2017 Anita Rivera PTA GP 1    20356254502 HC PT THER PROC GROUP 6/22/2017 Anita Rivera PTA GP 1          PT G-Codes  Outcome Measure Options: AM-PAC 6 Clicks Basic Mobility (PT)    Anita Rivera PTA  6/22/2017

## 2017-06-22 NOTE — PROGRESS NOTES
Orthopedic Progress Note        Patient: Navdeep Christopher    Date of Admission: 6/21/2017  7:53 AM    YOB: 1950    Medical Record Number: [id number]    Attending Physician: Jeovany Hercules MD      POD # 1     Status post- LA TOTAL KNEE ARTHROPLASTY [01902] (RT TOTAL KNEE ARTHROPLASTY)      Systemic or Specific Complaints: No Complaints      Allergies: No Known Allergies    Medications:   Current Medications:  Scheduled Meds:  acetaminophen 325 mg Oral 4x Daily   enoxaparin 30 mg Subcutaneous Daily   gabapentin 600 mg Oral Nightly   insulin NPH 70 Units Subcutaneous QAM   ketorolac 15 mg Intravenous Q6H   lisinopril 20 mg Oral Daily   metFORMIN 1,000 mg Oral Daily With Breakfast   mupirocin 1 application Nasal BID     Continuous Infusions:  lactated ringers 9 mL/hr Last Rate: 9 mL/hr (06/21/17 1035)   lactated ringers 75 mL/hr Last Rate: 75 mL/hr (06/21/17 1607)     PRN Meds:.acetaminophen **AND** diphenhydrAMINE  •  diazePAM  •  diphenhydrAMINE  •  docusate sodium  •  HYDROmorphone **AND** naloxone  •  magnesium hydroxide  •  ondansetron **OR** ondansetron ODT **OR** ondansetron  •  oxyCODONE-acetaminophen **OR** oxyCODONE-acetaminophen  •  sodium chloride  •  zolpidem      Physical Exam: 67 y.o. male  General Appearance:    alert and oriented                Pain Relief: Patient reports complete resolution   Vitals:    06/21/17 1655 06/21/17 1755 06/21/17 2341 06/22/17 0356   BP: 113/69 117/70 117/77 156/78   BP Location: Left arm Left arm Left arm Left arm   Patient Position: Lying Lying Lying Lying   Pulse: 100 101 109 102   Resp: 16 18 18 18   Temp:   96.9 °F (36.1 °C) 97.3 °F (36.3 °C)   TempSrc:   Oral Oral   SpO2: 95% 98% 96% 98%   Weight:       Height:                  Abdomen:     Normal bowel sounds, no masses, no organomegaly, soft        non-tender, non-distended, no guarding, no rebound                 tenderness       Extremities:   Operative extremity neurovascular status intact. ROM intact.     Incision intact w/out signs or  symptoms of infection. No           edema, no cyanosis, no calf tenderness. rom improving         Skin:     Skin Warm/Dry w/out ulceration, ecchymosis, rash, or   cyanosis     Activity: Mobilizing Per P.T.   Weight Bearing: As Tolerated    Diagnostic Tests:   Lab Results (last 24 hours)     Procedure Component Value Units Date/Time    POC Glucose Fingerstick [[id number]]  (Abnormal) Collected:  06/21/17 0829    Specimen:  Blood Updated:  06/21/17 0830     Glucose 161 (H) mg/dL     Narrative:       Meter: [id number] : [id number] Latisha BARNES    POC Glucose Fingerstick [[id number]]  (Abnormal) Collected:  06/21/17 1623    Specimen:  Blood Updated:  06/21/17 1626     Glucose 144 (H) mg/dL     Narrative:       Meter: [id number] : [id number] Patricio Lin    POC Glucose Fingerstick [[id number]]  (Abnormal) Collected:  06/21/17 2147    Specimen:  Blood Updated:  06/21/17 2149     Glucose 250 (H) mg/dL     Narrative:       Meter: [id number] : [id number] Klaus Lara    Hemoglobin & Hematocrit, Blood [[id number]]  (Abnormal) Collected:  06/22/17 0340    Specimen:  Blood Updated:  06/22/17 0422     Hemoglobin 11.8 (L) g/dL      Hematocrit 35.1 (L) %     POC Glucose Fingerstick [[id number]]  (Abnormal) Collected:  06/22/17 0607    Specimen:  Blood Updated:  06/22/17 0610     Glucose 216 (H) mg/dL     Narrative:       Meter: [id number] : [id number] Klaus Lara             Imaging Results (last 24 hours)     ** No results found for the last 24 hours. **           Assessment:    MT TOTAL KNEE ARTHROPLASTY [37973] (RT TOTAL KNEE ARTHROPLASTY)    Post-operative Pain  Immobility    Plan:    Continue efforts to mobilize  Continue Pain Control Measures  Continue incisional Care  Knee ok  littlew pain  Parkinson<s will slow him somewhat   Balance not good      Discharge Plan:prob dc home Friday     Date: 6/22/2017  Time: 6:59 AM    Jeovany Hercules MD

## 2017-06-23 LAB
BH CV LOWER VASCULAR LEFT COMMON FEMORAL AUGMENT: NORMAL
BH CV LOWER VASCULAR LEFT COMMON FEMORAL COMPETENT: NORMAL
BH CV LOWER VASCULAR LEFT COMMON FEMORAL COMPRESS: NORMAL
BH CV LOWER VASCULAR LEFT COMMON FEMORAL PHASIC: NORMAL
BH CV LOWER VASCULAR LEFT COMMON FEMORAL SPONT: NORMAL
BH CV LOWER VASCULAR RIGHT COMMON FEMORAL AUGMENT: NORMAL
BH CV LOWER VASCULAR RIGHT COMMON FEMORAL COMPETENT: NORMAL
BH CV LOWER VASCULAR RIGHT COMMON FEMORAL COMPRESS: NORMAL
BH CV LOWER VASCULAR RIGHT COMMON FEMORAL PHASIC: NORMAL
BH CV LOWER VASCULAR RIGHT COMMON FEMORAL SPONT: NORMAL
BH CV LOWER VASCULAR RIGHT DISTAL FEMORAL COMPRESS: NORMAL
BH CV LOWER VASCULAR RIGHT GASTRONEMIUS COMPRESS: NORMAL
BH CV LOWER VASCULAR RIGHT GREATER SAPH AK COMPRESS: NORMAL
BH CV LOWER VASCULAR RIGHT GREATER SAPH BK COMPRESS: NORMAL
BH CV LOWER VASCULAR RIGHT MID FEMORAL AUGMENT: NORMAL
BH CV LOWER VASCULAR RIGHT MID FEMORAL COMPETENT: NORMAL
BH CV LOWER VASCULAR RIGHT MID FEMORAL COMPRESS: NORMAL
BH CV LOWER VASCULAR RIGHT MID FEMORAL PHASIC: NORMAL
BH CV LOWER VASCULAR RIGHT MID FEMORAL SPONT: NORMAL
BH CV LOWER VASCULAR RIGHT PERONEAL COMPRESS: NORMAL
BH CV LOWER VASCULAR RIGHT POPLITEAL AUGMENT: NORMAL
BH CV LOWER VASCULAR RIGHT POPLITEAL COMPETENT: NORMAL
BH CV LOWER VASCULAR RIGHT POPLITEAL COMPRESS: NORMAL
BH CV LOWER VASCULAR RIGHT POPLITEAL PHASIC: NORMAL
BH CV LOWER VASCULAR RIGHT POPLITEAL SPONT: NORMAL
BH CV LOWER VASCULAR RIGHT POSTERIOR TIBIAL COMPRESS: NORMAL
BH CV LOWER VASCULAR RIGHT PROXIMAL FEMORAL COMPRESS: NORMAL
BH CV LOWER VASCULAR RIGHT SAPHENOFEMORAL JUNCTION COMPRESS: NORMAL
BH CV LOWER VASCULAR RIGHT SAPHENOFEMORAL JUNCTION PHASIC: NORMAL
BH CV LOWER VASCULAR RIGHT SAPHENOFEMORAL JUNCTION SPONT: NORMAL
GLUCOSE BLDC GLUCOMTR-MCNC: 133 MG/DL (ref 70–130)
GLUCOSE BLDC GLUCOMTR-MCNC: 222 MG/DL (ref 70–130)
GLUCOSE BLDC GLUCOMTR-MCNC: 230 MG/DL (ref 70–130)
GLUCOSE BLDC GLUCOMTR-MCNC: 269 MG/DL (ref 70–130)

## 2017-06-23 PROCEDURE — 25010000002 ENOXAPARIN PER 10 MG: Performed by: ORTHOPAEDIC SURGERY

## 2017-06-23 PROCEDURE — 97110 THERAPEUTIC EXERCISES: CPT

## 2017-06-23 PROCEDURE — 82962 GLUCOSE BLOOD TEST: CPT

## 2017-06-23 PROCEDURE — 97150 GROUP THERAPEUTIC PROCEDURES: CPT

## 2017-06-23 RX ORDER — GABAPENTIN 300 MG/1
600 CAPSULE ORAL EVERY 8 HOURS SCHEDULED
Status: DISCONTINUED | OUTPATIENT
Start: 2017-06-23 | End: 2017-06-24 | Stop reason: HOSPADM

## 2017-06-23 RX ADMIN — OXYCODONE HYDROCHLORIDE AND ACETAMINOPHEN 2 TABLET: 10; 325 TABLET ORAL at 18:39

## 2017-06-23 RX ADMIN — DOCUSATE SODIUM 100 MG: 100 CAPSULE, LIQUID FILLED ORAL at 08:27

## 2017-06-23 RX ADMIN — ACETAMINOPHEN 325 MG: 325 TABLET ORAL at 14:11

## 2017-06-23 RX ADMIN — OXYCODONE HYDROCHLORIDE AND ACETAMINOPHEN 2 TABLET: 10; 325 TABLET ORAL at 14:07

## 2017-06-23 RX ADMIN — GABAPENTIN 600 MG: 300 CAPSULE ORAL at 18:38

## 2017-06-23 RX ADMIN — OXYCODONE HYDROCHLORIDE AND ACETAMINOPHEN 2 TABLET: 10; 325 TABLET ORAL at 03:35

## 2017-06-23 RX ADMIN — ACETAMINOPHEN 325 MG: 325 TABLET ORAL at 08:27

## 2017-06-23 RX ADMIN — ENOXAPARIN SODIUM 30 MG: 30 INJECTION SUBCUTANEOUS at 08:27

## 2017-06-23 RX ADMIN — METFORMIN HYDROCHLORIDE 1000 MG: 1000 TABLET ORAL at 08:27

## 2017-06-23 RX ADMIN — DOCUSATE SODIUM 100 MG: 100 CAPSULE, LIQUID FILLED ORAL at 19:50

## 2017-06-23 RX ADMIN — ACETAMINOPHEN 325 MG: 325 TABLET ORAL at 18:39

## 2017-06-23 RX ADMIN — DIAZEPAM 5 MG: 5 TABLET ORAL at 19:50

## 2017-06-23 RX ADMIN — OXYCODONE HYDROCHLORIDE AND ACETAMINOPHEN 2 TABLET: 10; 325 TABLET ORAL at 09:38

## 2017-06-23 RX ADMIN — LISINOPRIL 20 MG: 20 TABLET ORAL at 08:27

## 2017-06-23 RX ADMIN — DIAZEPAM 5 MG: 5 TABLET ORAL at 04:25

## 2017-06-23 RX ADMIN — ZOLPIDEM TARTRATE 10 MG: 5 TABLET, FILM COATED ORAL at 22:32

## 2017-06-23 NOTE — PLAN OF CARE
Problem: Patient Care Overview (Adult)  Goal: Plan of Care Review  Outcome: Ongoing (interventions implemented as appropriate)    06/23/17 1113   Coping/Psychosocial Response Interventions   Plan Of Care Reviewed With patient   Patient Care Overview   Progress declining   Outcome Evaluation   Outcome Summary/Follow up Plan limited amb dist due to incr pain and fatgue

## 2017-06-23 NOTE — THERAPY TREATMENT NOTE
Acute Care - Physical Therapy Treatment Note  [facility]     Patient Name: Navdeep Christopher  : 1950  MRN: [id number]  Today's Date: 2017  Onset of Illness/Injury or Date of Surgery Date: 17  Date of Referral to PT: 17  Referring Physician: Diomedes    Admit Date: 2017    Visit Dx:    ICD-10-CM ICD-9-CM   1. Impaired gait and mobility R26.89 781.2     Patient Active Problem List   Diagnosis   • OA (osteoarthritis) of knee               Adult Rehabilitation Note       17 1105 17 1400 17 1200    Rehab Assessment/Intervention    Discipline physical therapy assistant  - physical therapy assistant  - physical therapy assistant  -    Document Type therapy note (daily note)  - therapy note (daily note)  - therapy note (daily note)  -    Subjective Information agree to therapy;complains of;weakness;fatigue;pain;swelling  - agree to therapy;complains of;weakness;fatigue;pain;swelling  - agree to therapy;complains of;weakness;fatigue;pain  -CW    Patient Effort, Rehab Treatment   good  -CW    Precautions/Limitations fall precautions  - fall precautions  - fall precautions  -    Specific Treatment Considerations exit alarm  - pt has resting tremors, celestina left hand  -     Recorded by [ILAN] Anita Rivera PTA [JM] Anita Rivera PTA [CW] Levy Bowers    Pain Assessment    Pain Assessment 0-10  - 0-10  - 0-10  -CW    Pain Score 10  -JM 7  -JM 7  -CW    Post Pain Score  8  - 7  -CW    Pain Type Surgical pain  - Surgical pain  - Surgical pain  -    Pain Location Knee  - Knee  - Knee  -CW    Pain Orientation Right  - Right  - Right  -CW    Pain Intervention(s) Medication (See MAR);Repositioned  - Medication (See MAR);Repositioned;Cold applied  - Repositioned;Ambulation/increased activity  -CW    Response to Interventions rosalinda, unchanged  -JM rosalinda, unchanged   - rosalinda  -CW    Recorded by [JM] Anita Rivera PTA [JM] Anita Rivera  PTA [CW] Levy Bowesr    Cognitive Assessment/Intervention    Current Cognitive/Communication Assessment   functional  -CW    Orientation Status   oriented x 4  -CW    Follows Commands/Answers Questions   100% of the time  -CW    Personal Safety   WNL/WFL  -CW    Personal Safety Interventions   fall prevention program maintained;gait belt;muscle strengthening facilitated;nonskid shoes/slippers when out of bed  -CW    Recorded by   [CW] Levy Bowers    ROM (Range of Motion)    General ROM Detail -8-72  -JM  9-90  -CW    Recorded by [JM] Anita Rivera PTA  [CW] Levy Bowers    Bed Mobility, Assessment/Treatment    Bed Mobility, Scoot/Bridge, McGee conditional independence  - conditional independence  -     Bed Mob, Sit to Supine, McGee   not tested  -CW    Bed Mobility, Comment  in chair  - in chair  -    Recorded by [JM] Anita Rivera PTA [JM] Anita Rivera PTA [CW] Levy Bowers    Transfer Assessment/Treatment    Transfers, Sit-Stand McGee verbal cues required;contact guard assist  - stand by assist;verbal cues required  - contact guard assist  -    Transfers, Stand-Sit [place] minimum assist (75% patient effort);verbal cues required  -  contact guard assist  -    Transfers, Sit-Stand-Sit, Assist Device   rolling walker  -    Transfer, Comment  slight forw flexed, post lean  -     Recorded by [JM] Anita Rivera PTA [JM] Anita Rivera PTA [CW] Levy Bowers    Gait Assessment/Treatment    Gait, [place] Level contact guard assist;verbal cues required  - contact guard assist;verbal cues required  - contact guard assist  -    Gait, Assistive Device rolling walker  - rolling walker  - rolling walker  -CW    Gait, Distance (Feet) 30  -JM 70  -  -CW    Gait, Gait Pattern Analysis   swing-to gait  -CW    Gait, Gait Deviations antalgic;alan decreased;decreased heel strike;forward flexed posture;step length  decreased  -JM alan decreased;forward flexed posture;step length decreased  - alan decreased;step length decreased;stride length decreased  -CW    Gait, Impairments decreased flexibility;strength decreased;pain  -JM decreased flexibility;strength decreased;pain  -JM ROM decreased;pain  -CW    Gait, Comment incr pain limiting  -JM fatigue and incr pain limiting dist  -JM     Recorded by [JM] Anita Rivera PTA [JM] Anita Rivera PTA [CW] Levy Bowers    Stairs Assessment/Treatment    Stairs, Comment  pt verbalized, declined perf; had prior sx, verbalized correctly  -     Recorded by  [JM] Anita Rivera PTA     Therapy Exercises    Exercise Protocols total knee  -JM total knee  -JM total knee  -CW    Total Knee Exercises right:;completed protocol;25 reps   no hip abd , no SLR per MD protocol  -JM right:;20 reps;completed protocol  -JM right:;15 reps;completed protocol  -CW    Recorded by [ILAN] Anita Rivera PTA [JM] Anita Rivera PTA [CW] Levy Bowers    Positioning and Restraints    Pre-Treatment Position sitting in chair/recliner  -JM sitting in chair/recliner  -JM sitting in chair/recliner  -CW    Post Treatment Position   chair  -CW    In Chair reclined;call light within reach;encouraged to call for assist;exit alarm on;notified nsg  -JM reclined;call light within reach;encouraged to call for assist;with nsg  -JM notified nsg;reclined;call light within reach;encouraged to call for assist  -CW    Recorded by [ILAN] Anita Rivera PTA [JM] Anita Rivera PTA [CW] Levy Bowers      User Key  (r) = Recorded By, (t) = Taken By, (c) = Cosigned By    Initials Name Effective Dates    ILAN Rivera PTA 02/18/16 -     CW Levy Bowers 12/13/16 -                 IP PT Goals       06/21/17 1606          Bed Mobility PT LTG    Bed Mobility PT LTG, Date Established 06/21/17  -LH      Bed Mobility PT LTG, Time to Achieve 2 days  -LH      Bed Mobility PT LTG, Activity Type all  bed mobility  -LH      Bed Mobility PT LTG, [place] Level supervision required  -      Transfer Training PT LTG    Transfer Training PT LTG, Date Established 06/21/17  -      Transfer Training PT LTG, Time to Achieve 2 days  -LH      Transfer Training PT LTG, Activity Type all transfers  -LH      Transfer Training PT LTG, [place] Level supervision required  -      Transfer Training PT LTG, Assist Device walker, rolling  -      Gait Training PT LTG    Gait Training Goal PT LTG, Date Established 06/21/17  -      Gait Training Goal PT LTG, Time to Achieve 2 days  -LH      Gait Training Goal PT LTG, [place] Level contact guard assist  -      Gait Training Goal PT LTG, Assist Device walker, rolling  -LH      Gait Training Goal PT LTG, Distance to Achieve 100  -LH      Stair Training PT LTG    Stair Training Goal PT LTG, Date Established 06/21/17  -      Stair Training Goal PT LTG, Time to Achieve 2 days  -LH      Stair Training Goal PT LTG, Number of Steps 8  -LH      Stair Training Goal PT LTG, [place] Level contact guard assist  -      Stair Training Goal PT LTG, Assist Device 1 handrail  -LH      Range of Motion PT LTG    Range of Motion Goal PT LTG, Date Established 06/21/17  -      Range of Motion Goal PT LTG, Time to Achieve 2 days  -LH      Range fo Motion Goal PT LTG, Joint R knee  -      Range of Motion Goal PT LTG, AROM Measure 5-85  -LH        User Key  (r) = Recorded By, (t) = Taken By, (c) = Cosigned By    Initials Name Provider Type     Ariana Love, PT Physical Therapist          Physical Therapy Education     Title: PT OT SLP Therapies (Done)     Topic: Physical Therapy (Done)     Point: Mobility training (Done)    Learning Progress Summary    Learner Readiness Method Response Comment Documented by Status   Patient Acceptance DICK ASENCIO D VU, NR JM 06/23/17 1112 Done    Acceptance DICK ASECNIO VU CW 06/22/17 1232 Done    Acceptance E NR   06/21/17 1606 Active                Point: Home exercise program (Done)    Learning Progress Summary    Learner Readiness Method Response Comment Documented by Status   Patient Acceptance E,TB,D ALFONSO,JACOB   06/23/17 1112 Done    Acceptance E,TB ALFONSO BOWEN   06/22/17 1232 Done    Acceptance E NR   06/21/17 1606 Active                      User Key     Initials Effective Dates Name Provider Type Discipline     02/07/17 -  Ariana Love, PT Physical Therapist PT     02/18/16 -  Anita Rivera, PTA Physical Therapy Assistant PT     12/13/16 -  Levy Bowers Physical Therapy Assistant PT                    PT Recommendation and Plan  Anticipated Discharge Disposition: home with 24/7 care, home with home health  Planned Therapy Interventions: balance training, bed mobility training, gait training, home exercise program, ROM (Range of Motion), stair training, strengthening, stretching, transfer training  PT Frequency: 2 times/day  Plan of Care Review  Plan Of Care Reviewed With: patient  Progress: declining  Outcome Summary/Follow up Plan: limited amb dist due to incr pain and fatgue          Outcome Measures       06/23/17 1100 06/22/17 1200 06/21/17 1600    How much help from another person do you currently need...    Turning from your back to your side while in flat bed without using bedrails? 4  - 3  -CW 3  -LH    Moving from lying on back to sitting on the side of a flat bed without bedrails? 4  - 3  -CW 3  -LH    Moving to and from a bed to a chair (including a wheelchair)? 3  - 3  - 3  -LH    Standing up from a chair using your arms (e.g., wheelchair, bedside chair)? 3  - 3  -CW 3  -LH    Climbing 3-5 steps with a railing? 2  - 2  -CW 2  -LH    To walk in hospital room? 3  - 3  -CW 3  -LH    AM-PAC 6 Clicks Score 19  - 17  -CW 17  -LH    Functional Assessment    Outcome Measure Options  AM-PAC 6 Clicks Basic Mobility (PT)  -CW AM-PAC 6 Clicks Basic Mobility (PT)  -      User Key  (r) = Recorded By, (t) = Taken By, (c) =  Cosigned By    Initials Name Provider Type     Ariana Love, PT Physical Therapist    ILAN Rivera PTA Physical Therapy Assistant    INNA Bowers Physical Therapy Assistant           Time Calculation:         PT Charges       06/23/17 1116          Time Calculation    Start Time 0943  -      Stop Time 1045  -ILAN      Time Calculation (min) 62 min  -ILAN      PT Received On 06/23/17  -ILAN      PT - Next Appointment 06/23/17  -ILAN        User Key  (r) = Recorded By, (t) = Taken By, (c) = Cosigned By    Initials Name Provider Type    ILAN Rivera PTA Physical Therapy Assistant          Therapy Charges for Today     Code Description Service Date Service Provider Modifiers Qty    20250675661 HC PT THER PROC EA 15 MIN 6/22/2017 Anita Rivera PTA GP 1    77608617090 HC PT THER PROC GROUP 6/22/2017 Anita Rivera PTA GP 1    38018662532 HC PT THER PROC EA 15 MIN 6/23/2017 Anita Rivera PTA GP 2    66882305879 HC PT THER PROC GROUP 6/23/2017 Anita Rivera PTA GP 1          PT G-Codes  Outcome Measure Options: AM-PAC 6 Clicks Basic Mobility (PT)    Anita Rivera PTA  6/23/2017

## 2017-06-23 NOTE — PLAN OF CARE
Problem: Patient Care Overview (Adult)  Goal: Plan of Care Review  Outcome: Ongoing (interventions implemented as appropriate)    06/23/17 0301   Coping/Psychosocial Response Interventions   Plan Of Care Reviewed With patient   Patient Care Overview   Progress improving   Outcome Evaluation   Outcome Summary/Follow up Plan VSS. Pain controlled with PO pain med. Doppler of RLE results pending. Ambulates with difficulty as pt had fall this shift during self transfer. Educated on importance of requesting assistance with transfers and ambulation. Educated on BP monitoring r/t HTN. BP WNL this shift. Educated on BS monitoring r/t diabetes. BS 64 this shift, OJ and crackers given. BS up to 72. Pt plans to d/c home with HH.        Goal: Discharge Needs Assessment  Outcome: Ongoing (interventions implemented as appropriate)    Problem: Knee Replacement, Total (Adult)  Goal: Signs and Symptoms of Listed Potential Problems Will be Absent or Manageable (Knee Replacement, Total)  Outcome: Ongoing (interventions implemented as appropriate)    Problem: Fall Risk (Adult)  Goal: Absence of Falls  Outcome: Ongoing (interventions implemented as appropriate)

## 2017-06-23 NOTE — NURSING NOTE
Spoke with LEXY Mcghee r/t pt fall this shift. No new orders. Called [facility] per Litzy as pt has doppler pending. Office is closed at this time. No results to report at this time.

## 2017-06-23 NOTE — NURSING NOTE
At 0335 when pt got up to go to restroom 3 Gabapentin 600 mg tablets were found in pt bed under sheet. States he brought them from home. He did not bring pill bottle with him to hospital to update medication list. Pt states he takes 600mg three times daily. This nurse explained that 2 300 mg capsules were administered at 2050 on 6/22 and that he only has the medication ordered nightly. Will put message on physician sticky note to notify of medication that pt is taking at home. The tablets were wasted with nurse Deysi Boone RN.

## 2017-06-23 NOTE — THERAPY TREATMENT NOTE
Acute Care - Physical Therapy Treatment Note  [facility]     Patient Name: Navdeep Christopher  : 1950  MRN: [id number]  Today's Date: 2017  Onset of Illness/Injury or Date of Surgery Date: 17  Date of Referral to PT: 17  Referring Physician: Diomedes    Admit Date: 2017    Visit Dx:    ICD-10-CM ICD-9-CM   1. Impaired gait and mobility R26.89 781.2     Patient Active Problem List   Diagnosis   • OA (osteoarthritis) of knee               Adult Rehabilitation Note       17 1532 17 1105 17 1400    Rehab Assessment/Intervention    Discipline physical therapy assistant  - physical therapy assistant  - physical therapy assistant  -    Document Type therapy note (daily note)  - therapy note (daily note)  - therapy note (daily note)  -    Subjective Information agree to therapy;complains of;weakness;fatigue;pain;swelling  - agree to therapy;complains of;weakness;fatigue;pain;swelling  - agree to therapy;complains of;weakness;fatigue;pain;swelling  -    Precautions/Limitations fall precautions  - fall precautions  - fall precautions  -    Specific Treatment Considerations exit alarm, still havng tremors-pt reports ~1yr  -JM exit alarm  - pt has resting tremors, celestina left hand  -JM    Recorded by [ILAN] Anita Rivera PTA [JM] Anita Rivera PTA [JM] Anita Rivera PTA    Pain Assessment    Pain Assessment 0-10  -JM 0-10  -JM 0-10  -JM    Pain Score 9  -JM 10  -JM 7  -JM    Post Pain Score 10  -JM  8  -JM    Pain Type Surgical pain  -JM Surgical pain  -JM Surgical pain  -JM    Pain Location Knee  -JM Knee  -JM Knee  -JM    Pain Orientation Right  -JM Right  -JM Right  -JM    Pain Intervention(s) Medication (See MAR);Repositioned;Cold applied  -JM Medication (See MAR);Repositioned  -JM Medication (See MAR);Repositioned;Cold applied  -JM    Response to Interventions rosalinda, unchanged  -JM rosalinda, unchanged  -JM rosalinda, unchanged   -JM    Recorded by [ILAN] Anita  HOLLAND Rivera [JM] Anita Rivera PTA [JM] Anita Rivera PTA    ROM (Range of Motion)    General ROM Detail  -8-72  -JM     Recorded by  [JM] Anita Rivera PTA     Bed Mobility, Assessment/Treatment    Bed Mobility, Scoot/Bridge, [place] conditional independence  - conditional independence  - conditional independence  -    Bed Mobility, Comment   in chair  -    Recorded by [JM] Anita Rivera PTA [JM] Anita Rivera PTA [JM] Anita Rivera PTA    Transfer Assessment/Treatment    Transfers, Sit-Stand [place] verbal cues required;minimum assist (75% patient effort)  - verbal cues required;contact guard assist  - stand by assist;verbal cues required  -    Transfers, Stand-Sit [place] minimum assist (75% patient effort);verbal cues required  - minimum assist (75% patient effort);verbal cues required  -     Transfer, Comment difficulty w/heel strike bilat to stand, forw head and trunk  -  slight forw flexed, post lean  -    Recorded by [] Anita Rivera PTA [JM] Anita Rivera PTA [JM] Anita Rivera PTA    Gait Assessment/Treatment    Gait, [place] Level contact guard assist;verbal cues required  - contact guard assist;verbal cues required  - contact guard assist;verbal cues required  -    Gait, Assistive Device rolling walker  - rolling walker  - rolling walker  -    Gait, Distance (Feet) 35  -JM 30  -JM 70  -JM    Gait, Gait Deviations antalgic;alan decreased;forward flexed posture;decreased heel strike;step length decreased  - antalgic;alan decreased;decreased heel strike;forward flexed posture;step length decreased  - alan decreased;forward flexed posture;step length decreased  -    Gait, Impairments decreased flexibility;strength decreased;pain  - decreased flexibility;strength decreased;pain  - decreased flexibility;strength decreased;pain  -    Gait, Comment cues for posture and HS  - incr pain limiting  - fatigue  and incr pain limiting dist  -    Recorded by [ILAN] Anita Rivera PTA [JM] Anita Rivera PTA [JM] Anita Rivera PTA    Stairs Assessment/Treatment    Stairs, Comment too painful and weak to try  -  pt verbalized, declined perf; had prior sx, verbalized correctly  -    Recorded by [ILAN] Anita Rivera PTA  [ILAN] Anita Rivera PTA    Therapy Exercises    Exercise Protocols total knee  - total knee  - total knee  -    Total Knee Exercises right:;completed protocol;30 reps;with assist;SAQ   no hip abd , no SLR per MD protocol  - right:;completed protocol;25 reps   no hip abd , no SLR per MD protocol  - right:;20 reps;completed protocol  -    Recorded by [ILAN] Anita Rivera PTA [ILAN] Anita Rivera PTA [JM] Anita Rivera PTA    Positioning and Restraints    Pre-Treatment Position sitting in chair/recliner  - sitting in chair/recliner  - sitting in chair/recliner  -    In Chair reclined;call light within reach;encouraged to call for assist;exit alarm on  - reclined;call light within reach;encouraged to call for assist;exit alarm on;notified nsg  - reclined;call light within reach;encouraged to call for assist;with nsg  -    Recorded by [ILAN] Anita Rivera PTA [ILAN] Anita Rivera PTA [ILAN] Anita Rivera PTA      06/22/17 [facility]          Rehab Assessment/Intervention    Discipline physical therapy assistant  -CW      Document Type therapy note (daily note)  -CW      Subjective Information agree to therapy;complains of;weakness;fatigue;pain  -CW      Patient Effort, Rehab Treatment good  -CW      Precautions/Limitations fall precautions  -CW      Recorded by [CW] Levy Bowers      Pain Assessment    Pain Assessment 0-10  -CW      Pain Score 7  -CW      Post Pain Score 7  -CW      Pain Type Surgical pain  -CW      Pain Location Knee  -CW      Pain Orientation Right  -CW      Pain Intervention(s) Repositioned;Ambulation/increased activity  -CW      Response to  Interventions rosalinda  -CW      Recorded by [INNA] Levy Bowers      Cognitive Assessment/Intervention    Current Cognitive/Communication Assessment functional  -CW      Orientation Status oriented x 4  -CW      Follows Commands/Answers Questions 100% of the time  -CW      Personal Safety WNL/WFL  -CW      Personal Safety Interventions fall prevention program maintained;gait belt;muscle strengthening facilitated;nonskid shoes/slippers when out of bed  -CW      Recorded by [INNA] Levy Bowers      ROM (Range of Motion)    General ROM Detail 9-90  -CW      Recorded by [INNA] Levy Bowers      Bed Mobility, Assessment/Treatment    Bed Mob, Sit to Supine, [place] not tested  -CW      Bed Mobility, Comment in chair  -CW      Recorded by [INNA] Levy Bowers      Transfer Assessment/Treatment    Transfers, Sit-Stand [place] contact guard assist  -CW      Transfers, Stand-Sit [place] contact guard assist  -CW      Transfers, Sit-Stand-Sit, Assist Device rolling walker  -CW      Recorded by [INNA] Levy Bowers      Gait Assessment/Treatment    Gait, [place] Level contact guard assist  -CW      Gait, Assistive Device rolling walker  -CW      Gait, Distance (Feet) 100  -CW      Gait, Gait Pattern Analysis swing-to gait  -CW      Gait, Gait Deviations alan decreased;step length decreased;stride length decreased  -CW      Gait, Impairments ROM decreased;pain  -CW      Recorded by [INNA] Levy Bowers      Therapy Exercises    Exercise Protocols total knee  -CW      Total Knee Exercises right:;15 reps;completed protocol  -CW      Recorded by [INNA] Levy Bowers      Positioning and Restraints    Pre-Treatment Position sitting in chair/recliner  -CW      Post Treatment Position chair  -CW      In Chair notified nsg;reclined;call light within reach;encouraged to call for assist  -CW      Recorded by [INNA] Levy Bowers        User Key  (r) = Recorded By, (t) = Taken By, (c) =  Cosigned By    Initials Name Effective Dates    ILAN Anita Rivera, PTA 02/18/16 -     CW Levy Bowers 12/13/16 -                 IP PT Goals       06/21/17 1606          Bed Mobility PT LTG    Bed Mobility PT LTG, Date Established 06/21/17  -      Bed Mobility PT LTG, Time to Achieve 2 days  -LH      Bed Mobility PT LTG, Activity Type all bed mobility  -      Bed Mobility PT LTG, [place] Level supervision required  -      Transfer Training PT LTG    Transfer Training PT LTG, Date Established 06/21/17  -      Transfer Training PT LTG, Time to Achieve 2 days  -LH      Transfer Training PT LTG, Activity Type all transfers  -      Transfer Training PT LTG, [place] Level supervision required  -      Transfer Training PT LTG, Assist Device walker, rolling  -      Gait Training PT LTG    Gait Training Goal PT LTG, Date Established 06/21/17  -      Gait Training Goal PT LTG, Time to Achieve 2 days  -LH      Gait Training Goal PT LTG, [place] Level contact guard assist  -      Gait Training Goal PT LTG, Assist Device walker, rolling  -      Gait Training Goal PT LTG, Distance to Achieve 100  -LH      Stair Training PT LTG    Stair Training Goal PT LTG, Date Established 06/21/17  -      Stair Training Goal PT LTG, Time to Achieve 2 days  -      Stair Training Goal PT LTG, Number of Steps 8  -LH      Stair Training Goal PT LTG, [place] Level contact guard assist  -      Stair Training Goal PT LTG, Assist Device 1 handrail  -LH      Range of Motion PT LTG    Range of Motion Goal PT LTG, Date Established 06/21/17  -      Range of Motion Goal PT LTG, Time to Achieve 2 days  -LH      Range fo Motion Goal PT LTG, Joint R knee  -LH      Range of Motion Goal PT LTG, AROM Measure 5-85  -LH        User Key  (r) = Recorded By, (t) = Taken By, (c) = Cosigned By    Initials Name Provider Type     Ariana Love PT Physical Therapist          Physical Therapy Education     Title: PT  OT SLP Therapies (Done)     Topic: Physical Therapy (Done)     Point: Mobility training (Done)    Learning Progress Summary    Learner Readiness Method Response Comment Documented by Status   Patient Acceptance E,TB,D ALFONSO,NR   06/23/17 1112 Done    Acceptance E,TB ALFONSO BOWEN   06/22/17 1232 Done    Acceptance E NR   06/21/17 1606 Active               Point: Home exercise program (Done)    Learning Progress Summary    Learner Readiness Method Response Comment Documented by Status   Patient Acceptance E,TB,D ALFONSO,NR   06/23/17 1112 Done    Acceptance E,ALFONSO KIRK   06/22/17 1232 Done    Acceptance E NR   06/21/17 1606 Active                      User Key     Initials Effective Dates Name Provider Type Discipline     02/07/17 -  Ariana Love, PT Physical Therapist PT     02/18/16 -  Anita Rivera, PTA Physical Therapy Assistant PT     12/13/16 -  Levy Bowers Physical Therapy Assistant PT                    PT Recommendation and Plan  Anticipated Discharge Disposition: home with 24/7 care, home with home health  Planned Therapy Interventions: balance training, bed mobility training, gait training, home exercise program, ROM (Range of Motion), stair training, strengthening, stretching, transfer training  PT Frequency: 2 times/day  Plan of Care Review  Plan Of Care Reviewed With: patient  Progress: declining  Outcome Summary/Follow up Plan: limited amb dist due to incr pain and fatgue          Outcome Measures       06/23/17 1100 06/22/17 1200 06/21/17 1600    How much help from another person do you currently need...    Turning from your back to your side while in flat bed without using bedrails? 4  -JM 3  -CW 3  -LH    Moving from lying on back to sitting on the side of a flat bed without bedrails? 4  -JM 3  -CW 3  -LH    Moving to and from a bed to a chair (including a wheelchair)? 3  -JM 3  -CW 3  -LH    Standing up from a chair using your arms (e.g., wheelchair, bedside chair)? 3  - 3  -CW 3  -LH     Climbing 3-5 steps with a railing? 2  -JM 2  -CW 2  -    To walk in hospital room? 3  -JM 3  -CW 3  -LH    AM-PAC 6 Clicks Score 19  -JM 17  -CW 17  -LH    Functional Assessment    Outcome Measure Options  AM-PAC 6 Clicks Basic Mobility (PT)  -CW AM-PAC 6 Clicks Basic Mobility (PT)  -      User Key  (r) = Recorded By, (t) = Taken By, (c) = Cosigned By    Initials Name Provider Type     Ariana Love, PT Physical Therapist    ILAN Rivera PTA Physical Therapy Assistant    INNA Bowers Physical Therapy Assistant           Time Calculation:         PT Charges       06/23/17 1538 06/23/17 1116       Time Calculation    Start Time 1415  - 0943  -     Stop Time 1525  - 1045  -     Time Calculation (min) 70 min  - 62 min  -ILAN     PT Received On 06/23/17  - 06/23/17  -ILAN     PT - Next Appointment 06/24/17  - 06/23/17  -       User Key  (r) = Recorded By, (t) = Taken By, (c) = Cosigned By    Initials Name Provider Type    ILAN Rivera PTA Physical Therapy Assistant          Therapy Charges for Today     Code Description Service Date Service Provider Modifiers Qty    26602032090 HC PT THER PROC EA 15 MIN 6/22/2017 Anita Rivera PTA GP 1    91279780210 HC PT THER PROC GROUP 6/22/2017 Anita Rivera PTA GP 1    44527221058 HC PT THER PROC EA 15 MIN 6/23/2017 Anita Rivera PTA GP 2    80225411816 HC PT THER PROC GROUP 6/23/2017 Anita Rivera PTA GP 1    15539657085 HC PT THER PROC EA 15 MIN 6/23/2017 Anita Rivera PTA GP 2    03176182347 HC PT THER PROC GROUP 6/23/2017 Anita Rivera, [place] GP 1          PT G-Codes  Outcome Measure Options: AM-PAC 6 Clicks Basic Mobility (PT)    Anita Rivera PTA  6/23/2017

## 2017-06-23 NOTE — PROGRESS NOTES
Orthopedic Progress Note        Patient: Navdeep Christopher    Date of Admission: 6/21/2017  7:53 AM    YOB: 1950    Medical Record Number: [id number]    Attending Physician: Jeovany Hercules MD      POD # 2     Status post- NH TOTAL KNEE ARTHROPLASTY [88699] (RT TOTAL KNEE ARTHROPLASTY)      Systemic or Specific Complaints: Pain Control      Allergies: No Known Allergies    Medications:   Current Medications:  Scheduled Meds:  acetaminophen 325 mg Oral 4x Daily   enoxaparin 30 mg Subcutaneous Daily   gabapentin 600 mg Oral Nightly   insulin NPH 70 Units Subcutaneous QAM   lisinopril 20 mg Oral Daily   metFORMIN 1,000 mg Oral Daily With Breakfast   mupirocin 1 application Nasal BID     Continuous Infusions:  lactated ringers 9 mL/hr Last Rate: 9 mL/hr (06/21/17 1035)   lactated ringers 75 mL/hr Last Rate: 75 mL/hr (06/21/17 1607)     PRN Meds:.acetaminophen **AND** diphenhydrAMINE  •  diazePAM  •  diphenhydrAMINE  •  docusate sodium  •  HYDROmorphone **AND** naloxone  •  magnesium hydroxide  •  ondansetron **OR** ondansetron ODT **OR** ondansetron  •  oxyCODONE-acetaminophen **OR** oxyCODONE-acetaminophen  •  sodium chloride  •  zolpidem      Physical Exam: 67 y.o. male  General Appearance:    alert and oriented                Pain Relief: Patient reports some relief   Vitals:    06/22/17 1500 06/22/17 2011 06/22/17 2318 06/23/17 0341   BP: 116/74 114/67 105/70 151/72   BP Location: Right arm Left arm Left arm Left arm   Patient Position: Lying Lying Lying Lying   Pulse: 92 102 100 91   Resp: 18 18 18 18   Temp: 98.1 °F (36.7 °C) 98.4 °F (36.9 °C) 99 °F (37.2 °C) 100.2 °F (37.9 °C)   TempSrc: Oral Oral Oral Oral   SpO2: 100% 94% 98% 91%   Weight:       Height:                  Abdomen:     Normal bowel sounds, no masses, no organomegaly, soft        non-tender, non-distended, no guarding, no rebound                 tenderness       Extremities:   Operative extremity neurovascular status intact. ROM intact.     Incision intact w/out signs or  symptoms of infection. No           edema, no cyanosis, no calf tenderness. rom improving         Skin:     Skin Warm/Dry w/out ulceration, ecchymosis, rash, or   cyanosis     Activity: Mobilizing Per P.T.   Weight Bearing: As Tolerated    Diagnostic Tests:   Lab Results (last 24 hours)     Procedure Component Value Units Date/Time    POC Glucose Fingerstick [[id number]]  (Abnormal) Collected:  06/22/17 1128    Specimen:  Blood Updated:  06/22/17 1140     Glucose 290 (H) mg/dL     Narrative:       Meter: [id number] : [id number] Patricio Lin    POC Glucose Fingerstick [[id number]]  (Abnormal) Collected:  06/22/17 1554    Specimen:  Blood Updated:  06/22/17 1558     Glucose 147 (H) mg/dL     Narrative:       Meter: [id number] : [id number] Ramona Cohw    POC Glucose Fingerstick [[id number]]  (Abnormal) Collected:  06/22/17 2118    Specimen:  Blood Updated:  06/22/17 2120     Glucose 64 (L) mg/dL     Narrative:       Meter: [id number] : [id number] Enrique Jane    POC Glucose Fingerstick [[id number]]  (Normal) Collected:  06/22/17 2222    Specimen:  Blood Updated:  06/22/17 2234     Glucose 72 mg/dL     Narrative:       Meter: [id number] : [id number] Enrique Jane    POC Glucose Fingerstick [[id number]]  (Abnormal) Collected:  06/23/17 0618    Specimen:  Blood Updated:  06/23/17 0619     Glucose 133 (H) mg/dL     Narrative:       Meter: [id number] : [id number] Enrique Jane             Imaging Results (last 24 hours)     ** No results found for the last 24 hours. **           Assessment:    VT TOTAL KNEE ARTHROPLASTY [74448] (RT TOTAL KNEE ARTHROPLASTY)    Post-operative Pain  Immobility    Plan:    Continue efforts to mobilize  Continue Pain Control Measures  Continue incisional Care  Slow  Coordination poor  Parkinsonian tremor   Still pain  Almost independent    Discharge Plan:dc in am     Date: 6/23/2017  Time: 6:41 AM    Jeovany Hercules MD

## 2017-06-24 VITALS
HEIGHT: 72 IN | HEART RATE: 86 BPM | SYSTOLIC BLOOD PRESSURE: 121 MMHG | BODY MASS INDEX: 28.1 KG/M2 | DIASTOLIC BLOOD PRESSURE: 62 MMHG | WEIGHT: 207.44 LBS | OXYGEN SATURATION: 96 % | RESPIRATION RATE: 18 BRPM | TEMPERATURE: 98.3 F

## 2017-06-24 LAB
GLUCOSE BLDC GLUCOMTR-MCNC: 188 MG/DL (ref 70–130)
GLUCOSE BLDC GLUCOMTR-MCNC: 211 MG/DL (ref 70–130)

## 2017-06-24 PROCEDURE — 82962 GLUCOSE BLOOD TEST: CPT

## 2017-06-24 PROCEDURE — 97150 GROUP THERAPEUTIC PROCEDURES: CPT

## 2017-06-24 PROCEDURE — 97110 THERAPEUTIC EXERCISES: CPT

## 2017-06-24 PROCEDURE — 25010000002 ENOXAPARIN PER 10 MG: Performed by: ORTHOPAEDIC SURGERY

## 2017-06-24 PROCEDURE — 63710000001 INSULIN ISOPHANE HUMAN PER 5 UNITS: Performed by: ORTHOPAEDIC SURGERY

## 2017-06-24 RX ORDER — DIAZEPAM 5 MG/1
5 TABLET ORAL EVERY 6 HOURS PRN
Qty: 20 TABLET | Refills: 0 | Status: SHIPPED | OUTPATIENT
Start: 2017-06-24 | End: 2017-07-01

## 2017-06-24 RX ORDER — ACETAMINOPHEN 325 MG/1
325 TABLET ORAL 4 TIMES DAILY
Qty: 60 TABLET | Refills: 0 | Status: SHIPPED | OUTPATIENT
Start: 2017-06-24

## 2017-06-24 RX ORDER — OXYCODONE AND ACETAMINOPHEN 10; 325 MG/1; MG/1
1 TABLET ORAL EVERY 6 HOURS PRN
Qty: 80 TABLET | Refills: 0 | Status: SHIPPED | OUTPATIENT
Start: 2017-06-24

## 2017-06-24 RX ORDER — ASPIRIN 325 MG
325 TABLET ORAL 2 TIMES DAILY
Qty: 80 TABLET | Refills: 1 | Status: SHIPPED | OUTPATIENT
Start: 2017-06-24

## 2017-06-24 RX ADMIN — HUMAN INSULIN 70 UNITS: 100 INJECTION, SUSPENSION SUBCUTANEOUS at 08:22

## 2017-06-24 RX ADMIN — OXYCODONE HYDROCHLORIDE AND ACETAMINOPHEN 2 TABLET: 10; 325 TABLET ORAL at 08:22

## 2017-06-24 RX ADMIN — LISINOPRIL 20 MG: 20 TABLET ORAL at 08:22

## 2017-06-24 RX ADMIN — GABAPENTIN 600 MG: 300 CAPSULE ORAL at 00:07

## 2017-06-24 RX ADMIN — ENOXAPARIN SODIUM 30 MG: 30 INJECTION SUBCUTANEOUS at 08:22

## 2017-06-24 RX ADMIN — OXYCODONE HYDROCHLORIDE AND ACETAMINOPHEN 2 TABLET: 10; 325 TABLET ORAL at 00:08

## 2017-06-24 RX ADMIN — MUPIROCIN 1 APPLICATION: 20 OINTMENT TOPICAL at 08:22

## 2017-06-24 RX ADMIN — GABAPENTIN 600 MG: 300 CAPSULE ORAL at 05:57

## 2017-06-24 RX ADMIN — DIAZEPAM 5 MG: 5 TABLET ORAL at 08:22

## 2017-06-24 RX ADMIN — OXYCODONE HYDROCHLORIDE AND ACETAMINOPHEN 2 TABLET: 10; 325 TABLET ORAL at 13:16

## 2017-06-24 RX ADMIN — OXYCODONE HYDROCHLORIDE AND ACETAMINOPHEN 2 TABLET: 10; 325 TABLET ORAL at 04:10

## 2017-06-24 RX ADMIN — METFORMIN HYDROCHLORIDE 1000 MG: 1000 TABLET ORAL at 08:22

## 2017-06-24 NOTE — THERAPY DISCHARGE NOTE
Acute Care - Physical Therapy Treatment Note/Discharge  [facility]     Patient Name: Navdeep Christopher  : 1950  MRN: [id number]  Today's Date: 2017  Onset of Illness/Injury or Date of Surgery Date: 17  Date of Referral to PT: 17  Referring Physician: Diomedes    Admit Date: 2017    Visit Dx:    ICD-10-CM ICD-9-CM   1. Impaired gait and mobility R26.89 781.2     Patient Active Problem List   Diagnosis   • OA (osteoarthritis) of knee       Physical Therapy Education     Title: PT OT SLP Therapies (Resolved)     Topic: Physical Therapy (Resolved)     Point: Mobility training (Resolved)    Learning Progress Summary    Learner Readiness Method Response Comment Documented by Status   Patient Acceptance E,TB,D VU,NR   17 1112 Done    Acceptance E,TB DU,VU   17 1232 Done    Acceptance E NR   17 1606 Active               Point: Home exercise program (Resolved)    Learning Progress Summary    Learner Readiness Method Response Comment Documented by Status   Patient Acceptance E,TB,D VU,NR   17 1112 Done    Acceptance E,TB DU,VU   17 1232 Done    Acceptance E NR   17 1606 Active                      User Key     Initials Effective Dates Name Provider Type Discipline     17 -  Ariana Love, PT Physical Therapist PT     16 -  Anita Rivera, PTA Physical Therapy Assistant PT     16 -  Levy Bowers Physical Therapy Assistant PT                    IP PT Goals       17 1606          Bed Mobility PT LTG    Bed Mobility PT LTG, Date Established 17  -      Bed Mobility PT LTG, Time to Achieve 2 days  -      Bed Mobility PT LTG, Activity Type all bed mobility  -      Bed Mobility PT LTG, [place] Level supervision required  -      Transfer Training PT LTG    Transfer Training PT LTG, Date Established 17  -      Transfer Training PT LTG, Time to Achieve 2 days  -      Transfer Training PT LTG, Activity  Type all transfers  -      Transfer Training PT LTG, [place] Level supervision required  -      Transfer Training PT LTG, Assist Device walker, rolling  -      Gait Training PT LTG    Gait Training Goal PT LTG, Date Established 06/21/17  -      Gait Training Goal PT LTG, Time to Achieve 2 days  -LH      Gait Training Goal PT LTG, [place] Level contact guard assist  -      Gait Training Goal PT LTG, Assist Device walker, rolling  -      Gait Training Goal PT LTG, Distance to Achieve 100  -LH      Stair Training PT LTG    Stair Training Goal PT LTG, Date Established 06/21/17  -      Stair Training Goal PT LTG, Time to Achieve 2 days  -LH      Stair Training Goal PT LTG, Number of Steps 8  -LH      Stair Training Goal PT LTG, [place] Level contact guard assist  -      Stair Training Goal PT LTG, Assist Device 1 handrail  -LH      Range of Motion PT LTG    Range of Motion Goal PT LTG, Date Established 06/21/17  -      Range of Motion Goal PT LTG, Time to Achieve 2 days  -LH      Range fo Motion Goal PT LTG, Joint R knee  -      Range of Motion Goal PT LTG, AROM Measure 5-85  -LH        User Key  (r) = Recorded By, (t) = Taken By, (c) = Cosigned By    Initials Name Provider Type     Ariana Love, PT Physical Therapist              Adult Rehabilitation Note       06/24/17 0950 06/23/17 1532 06/23/17 1105    Rehab Assessment/Intervention    Discipline physical therapist  -CHUCKIE physical therapy assistant  - physical therapy assistant  -    Document Type therapy note (daily note);discharge summary  - therapy note (daily note)  - therapy note (daily note)  -ILAN    Subjective Information agree to therapy;complains of;pain  - agree to therapy;complains of;weakness;fatigue;pain;swelling  - agree to therapy;complains of;weakness;fatigue;pain;swelling  -    Patient Effort, Rehab Treatment adequate  -      Precautions/Limitations fall precautions  - fall precautions  - fall  precautions  -    Specific Treatment Considerations pt having tremors at times  -EJ exit alarm, still havng tremors-pt reports ~1yr  -JM exit alarm  -JM    Recorded by [EJ] Mary Balderrama, PT [JM] Anita Rivera PTA [JM] Anita Rivera PTA    Pain Assessment    Pain Assessment 0-10  -EJ 0-10  -JM 0-10  -JM    Pain Score 5  -EJ 9  -JM 10  -JM    Post Pain Score  10  -JM     Pain Type  Surgical pain  -JM Surgical pain  -JM    Pain Location Knee  -EJ Knee  -JM Knee  -JM    Pain Orientation Right  -EJ Right  -JM Right  -JM    Pain Intervention(s)  Medication (See MAR);Repositioned;Cold applied  -JM Medication (See MAR);Repositioned  -JM    Response to Interventions  rosalinda, unchanged  -JM rosalinda, unchanged  -JM    Recorded by [EJ] Mary Balderrama, PT [JM] Anita Rivera PTA [JM] Anita Rivera PTA    ROM (Range of Motion)    General ROM Detail 13-86  -EJ  -8-72  -JM    Recorded by [EJ] Mary Balderrama, PT  [JM] Anita Rivera PTA    Bed Mobility, Assessment/Treatment    Bed Mobility, Scoot/Bridge, [place]  conditional independence  - conditional independence  -    Bed Mob, Supine to Sit, [place] not tested  -EJ      Bed Mob, Sit to Supine, [place] not tested  -EJ      Bed Mobility, Comment up in chair  -EJ      Recorded by [EJ] Mary Balderrama, PT [JM] Anita Rivera PTA [JM] Anita Rivera PTA    Transfer Assessment/Treatment    Transfers, Sit-Stand [place] verbal cues required;contact guard assist;minimum assist (75% patient effort)  - verbal cues required;minimum assist (75% patient effort)  - verbal cues required;contact guard assist  -    Transfers, Stand-Sit [place] minimum assist (75% patient effort)  - minimum assist (75% patient effort);verbal cues required  - minimum assist (75% patient effort);verbal cues required  -    Transfers, Sit-Stand-Sit, Assist Device rolling walker  -EJ      Transfer, Impairments pain;strength decreased  -EJ      Transfer,  Comment cues to increase B heel strike  -EJ difficulty w/heel strike bilat to stand, forw head and trunk  -JM     Recorded by [EJ] Mary Balderrama, PT [JM] Anita Rivera PTA [JM] Anita Rivera PTA    Gait Assessment/Treatment    Gait, [place] Level verbal cues required;contact guard assist  -EJ contact guard assist;verbal cues required  - contact guard assist;verbal cues required  -    Gait, Assistive Device rolling walker  -EJ rolling walker  -JM rolling walker  -JM    Gait, Distance (Feet) 45  -EJ 35  -JM 30  -JM    Gait, Gait Deviations antalgic;alan decreased;decreased heel strike;forward flexed posture;step length decreased  -EJ antalgic;alan decreased;forward flexed posture;decreased heel strike;step length decreased  - antalgic;alan decreased;decreased heel strike;forward flexed posture;step length decreased  -    Gait, Impairments decreased flexibility;ROM decreased;strength decreased;pain  -EJ decreased flexibility;strength decreased;pain  -JM decreased flexibility;strength decreased;pain  -JM    Gait, Comment  cues for posture and HS  -JM incr pain limiting  -JM    Recorded by [EJ] Mary Balderrama, PT [JM] Anita Rivera PTA [JM] Anita Rivera PTA    Stairs Assessment/Treatment    Stairs, Comment  too painful and weak to try  -     Recorded by  [JM] Anita Rivera PTA     Therapy Exercises    Exercise Protocols total knee  -EJ total knee  -JM total knee  -JM    Total Knee Exercises right:;completed protocol;30 reps  -EJ right:;completed protocol;30 reps;with assist;SAQ   no hip abd , no SLR per MD protocol  - right:;completed protocol;25 reps   no hip abd , no SLR per MD protocol  -    Recorded by [EJ] Mary Balderrama, PT [JM] Anita Rivera PTA [JM] Anita Rivera PTA    Positioning and Restraints    Pre-Treatment Position sitting in chair/recliner  -EJ sitting in chair/recliner  -JM sitting in chair/recliner  -JM    Post Treatment Position chair  -EJ       In Chair notified nsg;reclined;call light within reach;encouraged to call for assist  -EJ reclined;call light within reach;encouraged to call for assist;exit alarm on  - reclined;call light within reach;encouraged to call for assist;exit alarm on;notified nsg  -    Recorded by [EJ] Mary Balderrama, PT [JM] Anita Rivera PTA [JM] Anita Rivera PTA      06/22/17 1400 06/22/17 1200       Rehab Assessment/Intervention    Discipline physical therapy assistant  - physical therapy assistant  -     Document Type therapy note (daily note)  - therapy note (daily note)  -     Subjective Information agree to therapy;complains of;weakness;fatigue;pain;swelling  - agree to therapy;complains of;weakness;fatigue;pain  -CW     Patient Effort, Rehab Treatment  good  -CW     Precautions/Limitations fall precautions  - fall precautions  -     Specific Treatment Considerations pt has resting tremors, celest[place] left hand  -      Recorded by [JM] Anita Rivera PTA [CW] Levy Bowers     Pain Assessment    Pain Assessment 0-10  - 0-10  -CW     Pain Score 7  -JM 7  -CW     Post Pain Score 8  -JM 7  -CW     Pain Type Surgical pain  - Surgical pain  -     Pain Location Knee  - Knee  -     Pain Orientation Right  - Right  -CW     Pain Intervention(s) Medication (See MAR);Repositioned;Cold applied  - Repositioned;Ambulation/increased activity  -CW     Response to Interventions rosalinda, unchanged   - rosalinda  -CW     Recorded by [JM] Anita Rivera PTA [CW] Levy Bowers     Cognitive Assessment/Intervention    Current Cognitive/Communication Assessment  functional  -CW     Orientation Status  oriented x 4  -CW     Follows Commands/Answers Questions  100% of the time  -CW     Personal Safety  WNL/WFL  -CW     Personal Safety Interventions  fall prevention program maintained;gait belt;muscle strengthening facilitated;nonskid shoes/slippers when out of bed  -CW     Recorded by  [INNA] Levy FAIRCHILD  Robinson     ROM (Range of Motion)    General ROM Detail  9-90  -CW     Recorded by  [CW] Levy Bowers     Bed Mobility, Assessment/Treatment    Bed Mobility, Scoot/Bridge, [place] conditional independence  -JM      Bed Mob, Sit to Supine, [place]  not tested  -CW     Bed Mobility, Comment in chair  -JM in chair  -CW     Recorded by [JM] Anita Rivera PTA [CW] Levy Bowers     Transfer Assessment/Treatment    Transfers, Sit-Stand [place] stand by assist;verbal cues required  - contact guard assist  -CW     Transfers, Stand-Sit [place]  contact guard assist  -CW     Transfers, Sit-Stand-Sit, Assist Device  rolling walker  -CW     Transfer, Comment slight forw flexed, post lean  -JM      Recorded by [JM] Anita Rivera PTA [CW] Levy Bowers     Gait Assessment/Treatment    Gait, [place] Level contact guard assist;verbal cues required  - contact guard assist  -CW     Gait, Assistive Device rolling walker  - rolling walker  -CW     Gait, Distance (Feet) 70  -  -CW     Gait, Gait Pattern Analysis  swing-to gait  -     Gait, Gait Deviations alan decreased;forward flexed posture;step length decreased  - alan decreased;step length decreased;stride length decreased  -     Gait, Impairments decreased flexibility;strength decreased;pain  -JM ROM decreased;pain  -CW     Gait, Comment fatigue and incr pain limiting dist  -JM      Recorded by [JM] Anita Rivera PTA [CW] Levy Bowers     Stairs Assessment/Treatment    Stairs, Comment pt verbalized, declined perf; had prior sx, verbalized correctly  -JM      Recorded by [JM] Anita Rivera PTA      Therapy Exercises    Exercise Protocols total knee  -JM total knee  -CW     Total Knee Exercises right:;20 reps;completed protocol  -JM right:;15 reps;completed protocol  -CW     Recorded by [JM] Anita Rivera PTA [CW] Levy Bowers     Positioning and Restraints    Pre-Treatment Position sitting  in chair/recliner  -JM sitting in chair/recliner  -CW     Post Treatment Position  chair  -CW     In Chair reclined;call light within reach;encouraged to call for assist;with nsg  -JM notified nsg;reclined;call light within reach;encouraged to call for assist  -CW     Recorded by [JM] Anita Rivera PTA [CW] Levy Bowers       User Key  (r) = Recorded By, (t) = Taken By, (c) = Cosigned By    Initials Name Effective Dates    ILAN Rivera, HOLLAND 02/18/16 -     EJ Mary Balderrama, PT 04/21/17 -     CW Levy Bowers 12/13/16 -           PT Recommendation and Plan  Anticipated Discharge Disposition: home with 24/7 care, home with home health  Planned Therapy Interventions: balance training, bed mobility training, gait training, home exercise program, ROM (Range of Motion), stair training, strengthening, stretching, transfer training  PT Frequency: 2 times/day  Plan of Care Review  Plan Of Care Reviewed With: patient  Progress: improving  Outcome Summary/Follow up Plan: Pt with slow progress, but demonstrating improvement with exercises and ambulation. Pt DC home today.          Outcome Measures       06/24/17 1000 06/23/17 1100 06/22/17 1200    How much help from another person do you currently need...    Turning from your back to your side while in flat bed without using bedrails? 4  -EJ 4  -JM 3  -CW    Moving from lying on back to sitting on the side of a flat bed without bedrails? 4  -EJ 4  -JM 3  -CW    Moving to and from a bed to a chair (including a wheelchair)? 3  -EJ 3  -JM 3  -CW    Standing up from a chair using your arms (e.g., wheelchair, bedside chair)? 3  -EJ 3  -JM 3  -CW    Climbing 3-5 steps with a railing? 3  -EJ 2  -JM 2  -CW    To walk in hospital room? 3  -EJ 3  -JM 3  -CW    AM-PAC 6 Clicks Score 20  -EJ 19  -JM 17  -CW    Functional Assessment    Outcome Measure Options AM-PAC 6 Clicks Basic Mobility (PT)  -EJ  AM-PAC 6 Clicks Basic Mobility (PT)  -CW      06/21/17 1600           How much help from another person do you currently need...    Turning from your back to your side while in flat bed without using bedrails? 3  -LH      Moving from lying on back to sitting on the side of a flat bed without bedrails? 3  -LH      Moving to and from a bed to a chair (including a wheelchair)? 3  -LH      Standing up from a chair using your arms (e.g., wheelchair, bedside chair)? 3  -LH      Climbing 3-5 steps with a railing? 2  -LH      To walk in hospital room? 3  -LH      AM-PAC 6 Clicks Score 17  -      Functional Assessment    Outcome Measure Options AM-PAC 6 Clicks Basic Mobility (PT)  -        User Key  (r) = Recorded By, (t) = Taken By, (c) = Cosigned By    Initials Name Provider Type     Ariana Love, PT Physical Therapist    ILAN Rivera, PTA Physical Therapy Assistant    EJ Mary Balderrama, PT Physical Therapist    INNA Bowers Physical Therapy Assistant           Time Calculation:         PT Charges       06/24/17 1050          Time Calculation    Start Time 0950  -EJ      Stop Time 1050  -EJ      Time Calculation (min) 60 min  -EJ      PT Received On 06/24/17  -EJ        User Key  (r) = Recorded By, (t) = Taken By, (c) = Cosigned By    Initials Name Provider Type     Mary Balderrama, PT Physical Therapist          Therapy Charges for Today     Code Description Service Date Service Provider Modifiers Qty    71617313859 HC PT THER PROC EA 15 MIN 6/24/2017 Mary Balderrama, PT GP 2    27832390669 HC PT THER PROC GROUP 6/24/2017 Mary Balderrama, PT GP 1          PT G-Codes  Outcome Measure Options: AM-PAC 6 Clicks Basic Mobility (PT)    PT Discharge Summary  Reason for Discharge: Discharge from facility  Discharge Destination: Home with assist, Home with home health    Mary Balderrama, CHAPIS  6/24/2017

## 2017-06-24 NOTE — PLAN OF CARE
Problem: Patient Care Overview (Adult)  Goal: Plan of Care Review  Outcome: Ongoing (interventions implemented as appropriate)    06/23/17 1800   Coping/Psychosocial Response Interventions   Plan Of Care Reviewed With patient   Patient Care Overview   Progress progress toward functional goals as expected   Outcome Evaluation   Outcome Summary/Follow up Plan Pain being relieved with PO pain med. No hypoglycemic episodes noted. Frequently reminded not to get up without help. c/o not being able to have a BM - Given colase X 2. Hands tremor, but able to hold cups of water without difficulty. Frequently reminded not to get out of bed without assistance. Plans to go home with Home Health tomorrow.         Problem: Knee Replacement, Total (Adult)  Goal: Signs and Symptoms of Listed Potential Problems Will be Absent or Manageable (Knee Replacement, Total)  Outcome: Ongoing (interventions implemented as appropriate)    Problem: Fall Risk (Adult)  Goal: Absence of Falls  Outcome: Ongoing (interventions implemented as appropriate)

## 2017-06-24 NOTE — PLAN OF CARE
Problem: Patient Care Overview (Adult)  Goal: Plan of Care Review  Outcome: Ongoing (interventions implemented as appropriate)    06/24/17 7485   Coping/Psychosocial Response Interventions   Plan Of Care Reviewed With patient   Patient Care Overview   Progress progress toward functional goals as expected   Outcome Evaluation   Outcome Summary/Follow up Plan Vitals as charted, pain controlled with PO meds, possibly home in AM, will continue to monitor.        Goal: Adult Individualization and Mutuality  Outcome: Ongoing (interventions implemented as appropriate)  Goal: Discharge Needs Assessment  Outcome: Ongoing (interventions implemented as appropriate)    Problem: Fall Risk (Adult)  Goal: Absence of Falls  Outcome: Outcome(s) achieved Date Met:  06/24/17

## 2017-06-24 NOTE — DISCHARGE SUMMARY
DATE OF ADMISSION: 06/21/2017   DATE OF DISCHARGE: 06/24/2017     I want a copy of this to go with him to his home for home therapy and home health nursing.    HISTORY: This is a 67-year-old man with a painful right knee secondary to severe degenerative arthritis. He had a previous total knee on the left with an excellent result. He is now in for the same procedure.     DIAGNOSTIC DATA: Pertinent laboratory was not remarkable.     HOSPITAL COURSE: He was taken to surgery on the day of admission. Total knee arthroplasty, right, was performed under general anesthetic. The surgery was uneventful. Postoperatively, he was slow in becoming independent. On the 2nd day, he did have a slight temperature of 100.2. This dropped with Tylenol. He had no infection signs around the knee, had scant drainage by the 3rd day. His calf was soft, nontender. He had a tendency to keep his knee flexed, and he was warned about a flexion contracture if he continued this.  He was ambulating and certainly capable of taking care of himself at home.     DISPOSITION: Discharge home.     DISCHARGE INSTRUCTIONS:  1. Home health and therapy daily for at least a week.   2. Therapy should particularly watch him for a flexion contracture and continue working on extension of the knee.   3. He should keep the knee elevated during the day at heart level at least on multiple pillows.   4. Ice at least 3-4 times daily and bedtime.   5. He should not sit any longer than an hour at a time.   6. May shower on the #6 postop day.   7. Discontinue dressing on the right knee when the wound is dry.     HOME MEDICATIONS:   1. Percocet 10/325 q.4-6 h. p.r.n. pain.   2. Aspirin 325 b.i.d. for 6 weeks.     FOLLOWUP: Follow up in the office. He is to call for an appointment in approximately 10 days. He will either be seen at my office or at another on [facility]. He will be told when he calls to make an appointment.     FINAL DIAGNOSES:   1. Severe degenerative  arthritis, right knee.   2. Postop total knee, left.   3. Diabetes.   4. Parkinsonian tremor.     PROCEDURE: Total knee arthroplasty, right.        Jeovany Hercules M.D.  EAE:  D:   06/24/2017 07:49:29  T:   06/24/2017 08:10:31  Job ID:   [id number]  Document ID:   [id number]  cc:

## 2017-06-24 NOTE — PLAN OF CARE
Problem: Patient Care Overview (Adult)  Goal: Plan of Care Review  Outcome: Outcome(s) achieved Date Met:  06/24/17 06/24/17 1049   Coping/Psychosocial Response Interventions   Plan Of Care Reviewed With patient   Patient Care Overview   Progress improving   Outcome Evaluation   Outcome Summary/Follow up Plan Pt with slow progress, but demonstrating improvement with exercises and ambulation. Pt DC home today.

## 2017-06-24 NOTE — PROGRESS NOTES
Orthopedic Progress Note        Patient: Navdeep Christopher    Date of Admission: 6/21/2017  7:53 AM    YOB: 1950    Medical Record Number: [id number]    Attending Physician: Jeovany Hercules MD      POD # 3     Status post- CA TOTAL KNEE ARTHROPLASTY [53763] (RT TOTAL KNEE ARTHROPLASTY)      Systemic or Specific Complaints: No Complaints      Allergies: No Known Allergies    Medications:   Current Medications:  Scheduled Meds:  acetaminophen 325 mg Oral 4x Daily   enoxaparin 30 mg Subcutaneous Daily   gabapentin 600 mg Oral Q8H   insulin NPH 70 Units Subcutaneous QAM   lisinopril 20 mg Oral Daily   metFORMIN 1,000 mg Oral Daily With Breakfast   mupirocin 1 application Nasal BID     Continuous Infusions:  lactated ringers 9 mL/hr Last Rate: 9 mL/hr (06/21/17 1035)   lactated ringers 75 mL/hr Last Rate: 75 mL/hr (06/21/17 1607)     PRN Meds:.acetaminophen **AND** diphenhydrAMINE  •  diazePAM  •  diphenhydrAMINE  •  docusate sodium  •  HYDROmorphone **AND** naloxone  •  magnesium hydroxide  •  ondansetron **OR** ondansetron ODT **OR** ondansetron  •  oxyCODONE-acetaminophen **OR** oxyCODONE-acetaminophen  •  sodium chloride  •  zolpidem      Physical Exam: 67 y.o. male  General Appearance:    alert and oriented                Pain Relief: Patient reports complete resolution   Vitals:    06/23/17 1520 06/23/17 1803 06/23/17 2257 06/24/17 0300   BP: 126/76 137/79 122/71 112/64   BP Location: Left arm Left arm Left arm Left arm   Patient Position: Sitting Lying Lying Lying   Pulse: 98 97 91 95   Resp: 18 18 16 16   Temp: 99.3 °F (37.4 °C) 98.8 °F (37.1 °C) 98 °F (36.7 °C) 98.8 °F (37.1 °C)   TempSrc: Oral Oral Oral Oral   SpO2: 96% 99% 100% 97%   Weight:       Height:                  Abdomen:     Normal bowel sounds, no masses, no organomegaly, soft        non-tender, non-distended, no guarding, no rebound                 tenderness       Extremities:   Operative extremity neurovascular status intact. ROM  intact.    Incision intact w/out signs or  symptoms of infection. No           edema, no cyanosis, no calf tenderness. rom improving         Skin:     Skin Warm/Dry w/out ulceration, ecchymosis, rash, or   cyanosis     Activity: Mobilizing Per P.T.   Weight Bearing: As Tolerated    Diagnostic Tests:   Lab Results (last 24 hours)     Procedure Component Value Units Date/Time    POC Glucose Fingerstick [[id number]]  (Abnormal) Collected:  06/23/17 1123    Specimen:  Blood Updated:  06/23/17 1126     Glucose 222 (H) mg/dL     Narrative:       Meter: [id number] : [id number] Ramona Chow    POC Glucose Fingerstick [[id number]]  (Abnormal) Collected:  06/23/17 1549    Specimen:  Blood Updated:  06/23/17 1551     Glucose 230 (H) mg/dL     Narrative:       Meter: [id number] : [id number] Ramona Chow    POC Glucose Fingerstick [[id number]]  (Abnormal) Collected:  06/23/17 2038    Specimen:  Blood Updated:  06/23/17 2047     Glucose 269 (H) mg/dL     Narrative:       Meter: [id number] : [id number] Margo Villalta    POC Glucose Fingerstick [[id number]]  (Abnormal) Collected:  06/24/17 0544    Specimen:  Blood Updated:  06/24/17 0546     Glucose 188 (H) mg/dL     Narrative:       Meter: [id number] : [id number] Margo Villalta             Imaging Results (last 24 hours)     ** No results found for the last 24 hours. **           Assessment:    IL TOTAL KNEE ARTHROPLASTY [95656] (RT TOTAL KNEE ARTHROPLASTY)    Post-operative Pain  Immobility    Plan:    Continue efforts to mobilize  Continue Pain Control Measures  Continue incisional Care  Temp 99  Wound benign  Scant drainage tends to keep knee in flexion  Warned him about this and flexion contracture        Discharge Plan:dc home hh and pt     Date: 6/24/2017  Time: 7:33 AM    Jeovany Hercules MD

## 2017-06-26 NOTE — PAYOR COMM NOTE
"Navdeep Christopher (67 y.o. Male)     Date of Birth Social Security Number Address Home Phone MRN    [id number]  [id number] [address] [phone] [id number]    [employer] Mar[employer] Status          None        Admission Date Admission Type Admitting Provider Attending Provider Department, Room/Bed    6/21/17 Elective Jeovany Hercules MD  [address], P891/1    Discharge Date Discharge Disposition Discharge Destination        6/24/2017 Home-Health Care c             Attending Provider: (none)    Allergies:  No Known Allergies    Isolation:  None   Infection:  None   Code Status:  Prior    Ht:  72\" (182.9 cm)   Wt:  207 lb 7 oz (94.1 kg)    Admission Cmt:  None   Principal Problem:  None                Active Insurance as of 6/21/2017     Primary Coverage     Payor Plan Insurance Group Employer/Plan Group    [employer] PPO [id number]     Payor Plan Address Payor Plan Phone Number Effective From Effective To    [address] [phone] 1/1/2015     [place] [address]       Subscriber Name Subscriber Birth Date Member ID       RENETTA CHRISTOPHER 8/16/1957 [id number]                 Emergency Contacts      (Rel.) Home Phone Work Phone Mobile Phone    Renetta Christopher \"Anne Marie\" (Spouse) -- -- [phone]    Jade Christopher (Sister) -- -- [phone]            "

## 2017-06-28 ENCOUNTER — HOSPITAL ENCOUNTER (EMERGENCY)
Facility: HOSPITAL | Age: 67
Discharge: HOME | End: 2017-06-28
Attending: EMERGENCY MEDICINE | Admitting: EMERGENCY MEDICINE

## 2017-06-28 ENCOUNTER — APPOINTMENT (OUTPATIENT)
Dept: GENERAL RADIOLOGY | Facility: HOSPITAL | Age: 67
End: 2017-06-28

## 2017-06-28 VITALS
RESPIRATION RATE: 16 BRPM | OXYGEN SATURATION: 98 % | DIASTOLIC BLOOD PRESSURE: 75 MMHG | HEIGHT: 72 IN | WEIGHT: 188 LBS | SYSTOLIC BLOOD PRESSURE: 123 MMHG | TEMPERATURE: 98.9 F | BODY MASS INDEX: 25.47 KG/M2 | HEART RATE: 90 BPM

## 2017-06-28 DIAGNOSIS — G89.18 ACUTE POST-OPERATIVE PAIN: Primary | ICD-10-CM

## 2017-06-28 LAB
ALBUMIN SERPL-MCNC: 4.1 G/DL (ref 3.5–5.2)
ALBUMIN/GLOB SERPL: 1.1 G/DL
ALP SERPL-CCNC: 82 U/L (ref 39–117)
ALT SERPL W P-5'-P-CCNC: 17 U/L (ref 1–41)
ANION GAP SERPL CALCULATED.3IONS-SCNC: 14.6 MMOL/L
AST SERPL-CCNC: 18 U/L (ref 1–40)
BASOPHILS # BLD AUTO: 0.04 10*3/MM3 (ref 0–0.2)
BASOPHILS NFR BLD AUTO: 0.9 % (ref 0–1.5)
BILIRUB SERPL-MCNC: 1.2 MG/DL (ref 0.1–1.2)
BUN BLD-MCNC: 17 MG/DL (ref 8–23)
BUN/CREAT SERPL: 19.5 (ref 7–25)
CALCIUM SPEC-SCNC: 9.7 MG/DL (ref 8.6–10.5)
CHLORIDE SERPL-SCNC: 96 MMOL/L (ref 98–107)
CO2 SERPL-SCNC: 25.4 MMOL/L (ref 22–29)
CREAT BLD-MCNC: 0.87 MG/DL (ref 0.76–1.27)
DEPRECATED RDW RBC AUTO: 42.5 FL (ref 37–54)
EOSINOPHIL # BLD AUTO: 0.16 10*3/MM3 (ref 0–0.7)
EOSINOPHIL NFR BLD AUTO: 3.6 % (ref 0.3–6.2)
ERYTHROCYTE [DISTWIDTH] IN BLOOD BY AUTOMATED COUNT: 12.5 % (ref 11.5–14.5)
GFR SERPL CREATININE-BSD FRML MDRD: 88 ML/MIN/1.73
GLOBULIN UR ELPH-MCNC: 3.6 GM/DL
GLUCOSE BLD-MCNC: 220 MG/DL (ref 65–99)
HCT VFR BLD AUTO: 32.6 % (ref 40.4–52.2)
HGB BLD-MCNC: 11.3 G/DL (ref 13.7–17.6)
IMM GRANULOCYTES # BLD: 0 10*3/MM3 (ref 0–0.03)
IMM GRANULOCYTES NFR BLD: 0 % (ref 0–0.5)
LYMPHOCYTES # BLD AUTO: 0.7 10*3/MM3 (ref 0.9–4.8)
LYMPHOCYTES NFR BLD AUTO: 15.8 % (ref 19.6–45.3)
MCH RBC QN AUTO: 32.8 PG (ref 27–32.7)
MCHC RBC AUTO-ENTMCNC: 34.7 G/DL (ref 32.6–36.4)
MCV RBC AUTO: 94.8 FL (ref 79.8–96.2)
MONOCYTES # BLD AUTO: 0.46 10*3/MM3 (ref 0.2–1.2)
MONOCYTES NFR BLD AUTO: 10.4 % (ref 5–12)
NEUTROPHILS # BLD AUTO: 3.07 10*3/MM3 (ref 1.9–8.1)
NEUTROPHILS NFR BLD AUTO: 69.3 % (ref 42.7–76)
PLATELET # BLD AUTO: 313 10*3/MM3 (ref 140–500)
PMV BLD AUTO: 9.3 FL (ref 6–12)
POTASSIUM BLD-SCNC: 4.1 MMOL/L (ref 3.5–5.2)
PROT SERPL-MCNC: 7.7 G/DL (ref 6–8.5)
RBC # BLD AUTO: 3.44 10*6/MM3 (ref 4.6–6)
SODIUM BLD-SCNC: 136 MMOL/L (ref 136–145)
WBC NRBC COR # BLD: 4.43 10*3/MM3 (ref 4.5–10.7)

## 2017-06-28 PROCEDURE — 36415 COLL VENOUS BLD VENIPUNCTURE: CPT

## 2017-06-28 PROCEDURE — 99283 EMERGENCY DEPT VISIT LOW MDM: CPT

## 2017-06-28 PROCEDURE — 80053 COMPREHEN METABOLIC PANEL: CPT | Performed by: PHYSICIAN ASSISTANT

## 2017-06-28 PROCEDURE — 73560 X-RAY EXAM OF KNEE 1 OR 2: CPT

## 2017-06-28 PROCEDURE — 85025 COMPLETE CBC W/AUTO DIFF WBC: CPT | Performed by: PHYSICIAN ASSISTANT

## 2017-06-28 RX ORDER — SODIUM CHLORIDE 0.9 % (FLUSH) 0.9 %
10 SYRINGE (ML) INJECTION AS NEEDED
Status: DISCONTINUED | OUTPATIENT
Start: 2017-06-28 | End: 2017-06-28 | Stop reason: HOSPADM

## 2024-03-28 NOTE — BRIEF OP NOTE
TOTAL KNEE ARTHROPLASTY  Procedure Note    Navdeep Crhistopher  6/21/2017    Pre-op Diagnosis:   oa knee r    Post-op Diagnosis:     same    Procedure/CPT® Codes:      Procedure(s):  RT TOTAL KNEE ARTHROPLASTY    Surgeon(s):  Jeovany Hercules MD    Anesthesia: General    Staff:   Circulator: Khris Barrett RN; Adenike Alejandro RN  Scrub Person: Danny Tony; Lia Rivera  Assistant: Pawan Guerra MD  [place]: Roxy Johnson    Estimated Blood Loss: 50 cc  Urine Voided: * No values recorded between 6/21/2017 11:23 AM and 6/21/2017  1:21 PM *    Specimens:                  0  Drains:   Drain/Device Site 06/21/17 1300 Right knee collapsible closed device (Active)           Findings: oa knee    Complications: 0      Jeovany Hercules MD     Date: 6/21/2017  Time: 1:22 PM       [TextEntry] : Constitutional: no fatigue, appetite not decreased, no fever and no chills.  Eyes: no pain and no blurred vision.  Cardiovascular: no chest pain and no palpitations   Respiratory: no shortness of breath and no cough.  Gastrointestinal: no nausea, no constipation, no vomiting and no diarrhea.  Genitourinary: no dysuria and no nocturia.  Musculoskeletal: no muscle weakness and no myalgia.  Neurological: no headaches, no dizziness and no tremors.  Endocrine: no polydipsia, no cold intolerance and no heat intolerance.  Hematologic/Lymphatic: no tendency for easy bleeding and no tendency for easy bruising.

## (undated) DEVICE — KT DRN EVAC WND PVC PCH WTROC RND 10F400

## (undated) DEVICE — CEMENT MIXING SYSTEM WITH FEMORAL BREAKWAY NOZZLE: Brand: REVOLUTION

## (undated) DEVICE — SUT VIC 0 CT 36IN J958H

## (undated) DEVICE — DRSNG WND GZ CURAD OIL EMULSION 3X8IN LF STRL 1PK

## (undated) DEVICE — GLV SURG TRIUMPH CLASSIC PF LTX 9 STRL

## (undated) DEVICE — BNDG ELAS ELITE V/CLOSE 6IN 5YD LF STRL

## (undated) DEVICE — DRAPE,REIN 53X77,STERILE: Brand: MEDLINE

## (undated) DEVICE — PREMIUM WET SKIN PREP TRAY: Brand: MEDLINE INDUSTRIES, INC.

## (undated) DEVICE — UNDERCAST PADDING: Brand: DEROYAL

## (undated) DEVICE — SUT VIC 1 TP 1MS/4 27IN DYED J650G

## (undated) DEVICE — RECIPROCATING BLADE, DOUBLE SIDED, OFFSET  (70.0 X 0.64 X 12.6MM)

## (undated) DEVICE — DUAL CUT SAGITTAL BLADE

## (undated) DEVICE — GAUZE,SPONGE,FLUFF,6"X6.75",STRL,10/TRAY: Brand: MEDLINE

## (undated) DEVICE — STPLR SKIN VISISTAT WD 35CT

## (undated) DEVICE — BANDAGE,GAUZE,BULKEE II,4.5"X4.1YD,STRL: Brand: MEDLINE

## (undated) DEVICE — SOL NACL 0.9PCT 1000ML

## (undated) DEVICE — ENCORE® LATEX ORTHO SIZE 9, STERILE LATEX POWDER-FREE SURGICAL GLOVE: Brand: ENCORE

## (undated) DEVICE — GAUZE,SPONGE,FLUFF,6"X6.75",STRL,5/TRAY: Brand: MEDLINE

## (undated) DEVICE — PK KN TOTL 40

## (undated) DEVICE — DRSNG ADAPTIC 3X8